# Patient Record
Sex: FEMALE | Race: WHITE | Employment: OTHER | URBAN - METROPOLITAN AREA
[De-identification: names, ages, dates, MRNs, and addresses within clinical notes are randomized per-mention and may not be internally consistent; named-entity substitution may affect disease eponyms.]

---

## 2017-05-20 ENCOUNTER — HOSPITAL ENCOUNTER (EMERGENCY)
Facility: CLINIC | Age: 82
Discharge: HOME | End: 2017-05-20
Attending: EMERGENCY MEDICINE | Admitting: EMERGENCY MEDICINE
Payer: MEDICARE

## 2017-05-20 ENCOUNTER — APPOINTMENT (OUTPATIENT)
Dept: CT IMAGING | Facility: CLINIC | Age: 82
End: 2017-05-20
Attending: EMERGENCY MEDICINE
Payer: MEDICARE

## 2017-05-20 VITALS
DIASTOLIC BLOOD PRESSURE: 88 MMHG | SYSTOLIC BLOOD PRESSURE: 116 MMHG | OXYGEN SATURATION: 97 % | WEIGHT: 150 LBS | TEMPERATURE: 97.6 F | BODY MASS INDEX: 28.32 KG/M2 | HEIGHT: 61 IN | RESPIRATION RATE: 16 BRPM | HEART RATE: 96 BPM

## 2017-05-20 DIAGNOSIS — N32.89 BLADDER MASS: ICD-10-CM

## 2017-05-20 DIAGNOSIS — R10.11 RUQ ABDOMINAL PAIN: ICD-10-CM

## 2017-05-20 LAB
ALBUMIN SERPL-MCNC: 3.6 G/DL (ref 3.4–5)
ALBUMIN UR-MCNC: NEGATIVE MG/DL
ALP SERPL-CCNC: 98 U/L (ref 40–150)
ALT SERPL W P-5'-P-CCNC: 21 U/L (ref 0–50)
ANION GAP SERPL CALCULATED.3IONS-SCNC: 7 MMOL/L (ref 3–14)
APPEARANCE UR: CLEAR
AST SERPL W P-5'-P-CCNC: 20 U/L (ref 0–45)
BASOPHILS # BLD AUTO: 0 10E9/L (ref 0–0.2)
BASOPHILS NFR BLD AUTO: 0.4 %
BILIRUB SERPL-MCNC: 0.6 MG/DL (ref 0.2–1.3)
BILIRUB UR QL STRIP: NEGATIVE
BUN SERPL-MCNC: 22 MG/DL (ref 7–30)
CALCIUM SERPL-MCNC: 9.3 MG/DL (ref 8.5–10.1)
CHLORIDE SERPL-SCNC: 106 MMOL/L (ref 94–109)
CO2 SERPL-SCNC: 27 MMOL/L (ref 20–32)
COLOR UR AUTO: ABNORMAL
CREAT SERPL-MCNC: 0.72 MG/DL (ref 0.52–1.04)
DIFFERENTIAL METHOD BLD: ABNORMAL
EOSINOPHIL # BLD AUTO: 0.4 10E9/L (ref 0–0.7)
EOSINOPHIL NFR BLD AUTO: 5.1 %
ERYTHROCYTE [DISTWIDTH] IN BLOOD BY AUTOMATED COUNT: 13.5 % (ref 10–15)
GFR SERPL CREATININE-BSD FRML MDRD: 76 ML/MIN/1.7M2
GLUCOSE SERPL-MCNC: 91 MG/DL (ref 70–99)
GLUCOSE UR STRIP-MCNC: NEGATIVE MG/DL
HCT VFR BLD AUTO: 46.2 % (ref 35–47)
HGB BLD-MCNC: 16.1 G/DL (ref 11.7–15.7)
HGB UR QL STRIP: NEGATIVE
IMM GRANULOCYTES # BLD: 0 10E9/L (ref 0–0.4)
IMM GRANULOCYTES NFR BLD: 0.3 %
KETONES UR STRIP-MCNC: NEGATIVE MG/DL
LEUKOCYTE ESTERASE UR QL STRIP: NEGATIVE
LIPASE SERPL-CCNC: 173 U/L (ref 73–393)
LYMPHOCYTES # BLD AUTO: 1.9 10E9/L (ref 0.8–5.3)
LYMPHOCYTES NFR BLD AUTO: 24.6 %
MCH RBC QN AUTO: 32.5 PG (ref 26.5–33)
MCHC RBC AUTO-ENTMCNC: 34.8 G/DL (ref 31.5–36.5)
MCV RBC AUTO: 93 FL (ref 78–100)
MONOCYTES # BLD AUTO: 0.5 10E9/L (ref 0–1.3)
MONOCYTES NFR BLD AUTO: 5.7 %
MUCOUS THREADS #/AREA URNS LPF: PRESENT /LPF
NEUTROPHILS # BLD AUTO: 5 10E9/L (ref 1.6–8.3)
NEUTROPHILS NFR BLD AUTO: 63.9 %
NITRATE UR QL: NEGATIVE
NRBC # BLD AUTO: 0 10*3/UL
NRBC BLD AUTO-RTO: 0 /100
PH UR STRIP: 7 PH (ref 5–7)
PLATELET # BLD AUTO: 246 10E9/L (ref 150–450)
POTASSIUM SERPL-SCNC: 4 MMOL/L (ref 3.4–5.3)
PROT SERPL-MCNC: 7.4 G/DL (ref 6.8–8.8)
RBC # BLD AUTO: 4.95 10E12/L (ref 3.8–5.2)
RBC #/AREA URNS AUTO: 1 /HPF (ref 0–2)
SODIUM SERPL-SCNC: 140 MMOL/L (ref 133–144)
SP GR UR STRIP: 1.01 (ref 1–1.03)
SQUAMOUS #/AREA URNS AUTO: <1 /HPF (ref 0–1)
URN SPEC COLLECT METH UR: ABNORMAL
UROBILINOGEN UR STRIP-MCNC: NORMAL MG/DL (ref 0–2)
WBC # BLD AUTO: 7.9 10E9/L (ref 4–11)
WBC #/AREA URNS AUTO: 4 /HPF (ref 0–2)

## 2017-05-20 PROCEDURE — 99285 EMERGENCY DEPT VISIT HI MDM: CPT | Mod: 25

## 2017-05-20 PROCEDURE — 83690 ASSAY OF LIPASE: CPT | Performed by: EMERGENCY MEDICINE

## 2017-05-20 PROCEDURE — 85025 COMPLETE CBC W/AUTO DIFF WBC: CPT | Performed by: EMERGENCY MEDICINE

## 2017-05-20 PROCEDURE — 74177 CT ABD & PELVIS W/CONTRAST: CPT

## 2017-05-20 PROCEDURE — 81001 URINALYSIS AUTO W/SCOPE: CPT | Performed by: EMERGENCY MEDICINE

## 2017-05-20 PROCEDURE — 80053 COMPREHEN METABOLIC PANEL: CPT | Performed by: EMERGENCY MEDICINE

## 2017-05-20 PROCEDURE — 25000125 ZZHC RX 250: Performed by: EMERGENCY MEDICINE

## 2017-05-20 PROCEDURE — 25000128 H RX IP 250 OP 636: Performed by: EMERGENCY MEDICINE

## 2017-05-20 RX ORDER — IOPAMIDOL 755 MG/ML
75 INJECTION, SOLUTION INTRAVASCULAR ONCE
Status: COMPLETED | OUTPATIENT
Start: 2017-05-20 | End: 2017-05-20

## 2017-05-20 RX ADMIN — SODIUM CHLORIDE 63 ML: 9 INJECTION, SOLUTION INTRAVENOUS at 11:19

## 2017-05-20 RX ADMIN — IOPAMIDOL 75 ML: 755 INJECTION, SOLUTION INTRAVENOUS at 11:18

## 2017-05-20 ASSESSMENT — ENCOUNTER SYMPTOMS
CONSTIPATION: 0
FREQUENCY: 0
DYSURIA: 0
HEMATURIA: 0
DIARRHEA: 0
NAUSEA: 0
SHORTNESS OF BREATH: 0
ABDOMINAL PAIN: 1
BLOOD IN STOOL: 0
FEVER: 0
VOMITING: 0

## 2017-05-20 NOTE — ED AVS SNAPSHOT
Emergency Department    [address] [place] [address]    Phone:  [phone]    Fax:  [phone]                                       Eliana Lambert   MRN: [id number]    Department:   Emergency Department   Date of Visit:  5/20/2017           After Visit Summary Signature Page     I have received my discharge instructions, and my questions have been answered. I have discussed any challenges I see with this plan with the nurse or doctor.    ..........................................................................................................................................  Patient/Patient Representative Signature      ..........................................................................................................................................  Patient Representative Print Name and Relationship to Patient    ..................................................               ................................................  Date                                            Time    ..........................................................................................................................................  Reviewed by Signature/Title    ...................................................              ..............................................  Date                                                            Time

## 2017-05-20 NOTE — DISCHARGE INSTRUCTIONS
Abdominal Pain  Abdominal pain is pain in the stomach or intestinal area. Everyone has this pain from time to time. In many cases it goes away on its own. But abdominal pain can sometimes be due to a serious problem, such as appendicitis. So it s important to know when to seek help.  Causes of abdominal pain  There are many possible causes of abdominal pain. Common causes in adults include:    Constipation, diarrhea, or gas    GERD (gastroesophageal reflux disease) movement of stomach acid into the esophagus, also known as acid reflux or heartburn    Peptic ulcer (a sore in the lining of the stomach or small intestine)    Inflammation of the gallbladder, liver, or pancreas    Gallstones or kidney stones    Appendicitis     Obstruction of the intestines     Hernia (bulging of an internal organ through a muscle or other tissue)    Urinary tract infections    In women, menstrual cramps, fibroids, or endometriosis of the uterus    Inflammation or infection of the intestines  Diagnosing the cause of abdominal pain  Your health care provider will examine you to help find the cause of your pain. If needed, tests will be ordered. Because abdominal pain has so many possible causes, it can be hard to discover the reason for the pain. Giving details about your pain can help. Be ready to tell your health care provider where and when you feel the pain and what makes it better or worse. Also mention whether you have other symptoms such as fever, tiredness, nausea, vomiting, or changes in bathroom habits.  Treating abdominal pain  Certain causes of pain, such as appendicitis or a bowel obstruction, need emergency treatment. Other problems can be treated with rest, fluids, or medications. Your health care provider can give you specific instructions for treatment or self-care based on the cause of your pain.  If you have vomiting or diarrhea, sip water or other clear fluids. When you are ready to eat solid foods again, start with  small amounts of easy-to-digest, low-fat foods, such as applesauce, toast, or crackers.   When to call the doctor  Call 911 or go to the hospital right away if you:    Can t pass stool and are vomiting    Are vomiting blood or have black, tarry diarrhea    Also have chest, neck, or shoulder pain    Feel like you are about to pass out    Have pain in your shoulder blades with nausea    Have sudden, excruciating abdominal pain    Have new, severe pain unlike any you have felt before    Have a belly that is rigid, hard, and tender to touch  Call your doctor if you have:    Pain for more than 5 days    Bloating for more than 2 days    Diarrhea for more than 5 days    Fever of 101 F (38.3 C) or higher    Pain that continues to worsen    Unexplained weight loss    Continued lack of appetite    Blood in the stool  How to prevent abdominal pain  Here are some tips to help prevent abdominal pain:    Eat smaller amounts of food at one time.    Avoid greasy, fried, or other high-fat foods.    Avoid foods that give you gas.    Exercise regularly.    Drink plenty of fluids.  To help prevent symptoms of gastroesophageal reflux disease (GERD):    Quit smoking.    Reduce alcohol and certain foods that increase stomach acid.     Lose excess weight.    Finish eating at least 2 hours before you go to bed or lie down.    Elevate the head of your bed.    6623-5964 [employer]. [address] [place] [address], [place] [address]. All rights reserved. This information is not intended as a substitute for professional medical care. Always follow your healthcare professional's instructions.

## 2017-05-20 NOTE — ED PROVIDER NOTES
History     Chief Complaint:  Abdominal Pain       HPI   Eliana Lambert is a 89 year old female with a history of diverticulitis and a previous cholecystectomy who presents accompanied by her daughter for evaluation of abdominal pain. On 5/19/2017, the patient started to develop intermittent cramping right-sided abdominal pain. That night, the patient's pain worsened, prompting her to seek evaluation in the ED this morning. Currently in the ED, the patient's pain is resolved. She states that her recent pain felt similar to what she experienced with diverticulitis, and she expresses primary concern that her pain could be representative of diverticulitis. She has not had any fever, nausea, vomiting, diarrhea, constipation, bloody stools, dysuria, hematuria, urinary frequency, chest pain, or shortness of breath in association with her recent pain. Her last bowel movement was earlier this morning and this was normal. She takes aspirin but is not otherwise anticoagulated.     Allergies:  Clindamycin   Neosporin  Penicillins  Sulfa drugs      Medications:    levothyroxine (SYNTHROID, LEVOTHROID) 25 MCG tablet  aspirin 81 MG chewable tablet  B Complex-C (SUPER B COMPLEX PO)  LECITHIN PO  Cranberry 1000 MG CAPS  Omega-3 Fatty Acids (OMEGA-3 FISH OIL PO)  calcium carbonate (OS-BRADEN 500 MG [place]. CA) 500 MG tablet    Past Medical History:    Chronic kidney disease  Hypothyroidism   Hyperlipidemia  Hypertension    Diverticulitis     Past Surgical History:    Cholecystectomy     Family History:    History reviewed. No pertinent family history.     Social History:  Tobacco use:    Never smoker  Alcohol use:    Positive, occasionally  Marital status:       Accompanied to ED by:  Daughter      Review of Systems   Constitutional: Negative for fever.   Respiratory: Negative for shortness of breath.    Cardiovascular: Negative for chest pain.   Gastrointestinal: Positive for abdominal pain (right-sided, improved ).  "Negative for blood in stool, constipation, diarrhea, nausea and vomiting.   Genitourinary: Negative for dysuria, frequency and hematuria.   All other systems reviewed and are negative.    Physical Exam   First Vitals:  BP: 116/88  Pulse: 96  Temp: 97.6  F (36.4  C)  Resp: 16  Height: 154.9 cm (5' 1\")  Weight: 68 kg (150 lb)  SpO2: 97 %      Physical Exam  SKIN:  Warm, dry.  No jaundice.    HEMATOLOGIC/IMMUNOLOGIC/LYMPHATIC:  No pallor.  EYES:  Conjunctivae normal.  CARDIOVASCULAR:  Regular rate and rhythm.  RESPIRATORY:  No respiratory distress, breath sounds equal and normal.  GASTROINTESTINAL:  Soft tender abdomen on the right side with active bowel sounds.  No distension.  No abdominal mass.  No hepatomegaly.  MUSCULOSKELETAL:  ROM of the torso does not exacerbate the abdominal pain.  NEUROLOGIC:  Alert, conversant.  PSYCHIATRIC:  Normal mood.    Emergency Department Course     Imaging:  Radiographic findings were communicated with the patient and family who voiced understanding of the findings.    CT Abdomen Pelvis w Contrast:  IMPRESSION:  1. No acute process demonstrated in the abdomen or pelvis.  2. Enlarging lesions in the bladder concerning for malignancy. Cystoscopy recommended for further evaluation.  Preliminary report per radiology.     Laboratory:  CBC: HGB 16.1 high, o/w WNL (WBC 7.9, )    BMP: WNL (Creatinine 0.72)  Lipase: 173   UA with Microscopic: WBC 4 high, Mucous present, o/w Negative     Emergency Department Course:  Nursing notes and vitals reviewed.  1019: I performed an exam of the patient as documented above.     1159: I updated and reassessed the patient.     Findings and plan explained to the Patient and daughter. Patient discharged home with instructions regarding supportive care, medications, and reasons to return. The importance of close follow-up was reviewed.    Impression & Plan      Medical Decision Making:  Eliana Lambert is a 89 year old female who presents with " intermittent abdominal pain favoring the right side, specifically the right upper quadrant. Thorough evaluation undertaken which revealed no distinct cause for her pain. Incidentally noted was bladder lesions, which will need further investigation likely by cystoscopy and urologic follow up. I informed the patient and her daughter of these findings and they understand that this needs further assessment. Here, she has been symptom free during her evaluation. I advised tylenol for pain if need be and to return if recurrent concerning pain.     Diagnosis:    ICD-10-CM   1. RUQ abdominal pain R10.11   2. Bladder mass N32.89       Disposition:  Discharged to home.       I, Britton Yang, am serving as a scribe at 10:19 AM on 5/20/2017 to document services personally performed by Dr. Ivan, based on my observations and the provider's statements to me.     EMERGENCY DEPARTMENT       Stephen Ivan MD  05/20/17 7201

## 2017-05-20 NOTE — ED AVS SNAPSHOT
Emergency Department    [address] [facility] [address]    Phone:  [phone]    Fax:  [phone]                                       Eliana Lambert   MRN: [id number]    Department:   Emergency Department   Date of Visit:  5/20/2017           Patient Information     Date Of Birth          9/13/1925        Your diagnoses for this visit were:     RUQ abdominal pain     Bladder mass        You were seen by Stephen Ivan MD.      Follow-up Information     Follow up with Oswald Hodges MD.    Specialty:  Urology    Why:  follow up with the urologist to check your bladder     Contact information:    [facility]  [address] [address]  [phone]          Discharge Instructions         Abdominal Pain  Abdominal pain is pain in the stomach or intestinal area. Everyone has this pain from time to time. In many cases it goes away on its own. But abdominal pain can sometimes be due to a serious problem, such as appendicitis. So it s important to know when to seek help.  Causes of abdominal pain  There are many possible causes of abdominal pain. Common causes in adults include:    Constipation, diarrhea, or gas    GERD (gastroesophageal reflux disease) movement of stomach acid into the esophagus, also known as acid reflux or heartburn    Peptic ulcer (a sore in the lining of the stomach or small intestine)    Inflammation of the gallbladder, liver, or pancreas    Gallstones or kidney stones    Appendicitis     Obstruction of the intestines     Hernia (bulging of an internal organ through a muscle or other tissue)    Urinary tract infections    In women, menstrual cramps, fibroids, or endometriosis of the uterus    Inflammation or infection of the intestines  Diagnosing the cause of abdominal pain  Your health care provider will examine you to help find the cause of your pain. If needed, tests will be ordered. Because abdominal pain has so many possible  causes, it can be hard to discover the reason for the pain. Giving details about your pain can help. Be ready to tell your health care provider where and when you feel the pain and what makes it better or worse. Also mention whether you have other symptoms such as fever, tiredness, nausea, vomiting, or changes in bathroom habits.  Treating abdominal pain  Certain causes of pain, such as appendicitis or a bowel obstruction, need emergency treatment. Other problems can be treated with rest, fluids, or medications. Your health care provider can give you specific instructions for treatment or self-care based on the cause of your pain.  If you have vomiting or diarrhea, sip water or other clear fluids. When you are ready to eat solid foods again, start with small amounts of easy-to-digest, low-fat foods, such as applesauce, toast, or crackers.   When to call the doctor  Call 911 or go to the hospital right away if you:    Can t pass stool and are vomiting    Are vomiting blood or have black, tarry diarrhea    Also have chest, neck, or shoulder pain    Feel like you are about to pass out    Have pain in your shoulder blades with nausea    Have sudden, excruciating abdominal pain    Have new, severe pain unlike any you have felt before    Have a belly that is rigid, hard, and tender to touch  Call your doctor if you have:    Pain for more than 5 days    Bloating for more than 2 days    Diarrhea for more than 5 days    Fever of 101 F (38.3 C) or higher    Pain that continues to worsen    Unexplained weight loss    Continued lack of appetite    Blood in the stool  How to prevent abdominal pain  Here are some tips to help prevent abdominal pain:    Eat smaller amounts of food at one time.    Avoid greasy, fried, or other high-fat foods.    Avoid foods that give you gas.    Exercise regularly.    Drink plenty of fluids.  To help prevent symptoms of gastroesophageal reflux disease (GERD):    Quit smoking.    Reduce alcohol and  certain foods that increase stomach acid.     Lose excess weight.    Finish eating at least 2 hours before you go to bed or lie down.    Elevate the head of your bed.    2603-4469 [employer]. [address] [place] [address], [place] [address]. All rights reserved. This information is not intended as a substitute for professional medical care. Always follow your healthcare professional's instructions.          24 Hour Appointment Hotline       To make an appointment at any [facility], call [phone] ([phone]). If you don't have a family doctor or clinic, we will help you find one. [facility] are conveniently located to serve the needs of you and your family.             Review of your medicines      Our records show that you are taking the medicines listed below. If these are incorrect, please call your family doctor or clinic.        Dose / Directions Last dose taken    aspirin 81 MG chewable tablet   Dose:  325 mg        Take 325 mg by mouth daily   Refills:  0        calcium carbonate 500 MG tablet   Commonly known as:  OS-BRADEN 500 mg [place]. Ca   Dose:  500 mg        Take 500 mg by mouth 2 times daily   Refills:  0        Cranberry 1000 MG Caps        Refills:  0        LECITHIN PO        Refills:  0        levothyroxine 25 MCG tablet   Commonly known as:  SYNTHROID/LEVOTHROID   Dose:  25 mcg   Quantity:  90 tablet        Take 1 tablet (25 mcg) by mouth daily   Refills:  3        OMEGA-3 FISH OIL PO        Refills:  0        SUPER B COMPLEX PO        Refills:  0                Procedures and tests performed during your visit     CBC with platelets differential    CT Abdomen Pelvis w Contrast    Comprehensive metabolic panel    Lipase    UA with Microscopic      Orders Needing Specimen Collection     None      Pending Results     Date and Time Order Name Status Description    5/20/2017 1026 CT Abdomen Pelvis w Contrast Preliminary             Pending Culture Results     No orders found  from 5/18/2017 to 5/21/2017.            Pending Results Instructions     If you had any lab results that were not finalized at the time of your Discharge, you can call the ED Lab Result RN at [phone]. You will be contacted by this team for any positive Lab results or changes in treatment. The nurses are available 7 days a week from 10A to 6:30P.  You can leave a message 24 hours per day and they will return your call.        Test Results From Your Hospital Stay        5/20/2017 10:39 AM      Component Results     Component Value Ref Range & Units Status    WBC 7.9 4.0 - 11.0 10e9/L Final    RBC Count 4.95 3.8 - 5.2 10e12/L Final    Hemoglobin 16.1 (H) 11.7 - 15.7 g/dL Final    Hematocrit 46.2 35.0 - 47.0 % Final    MCV 93 78 - 100 fl Final    MCH 32.5 26.5 - 33.0 pg Final    MCHC 34.8 31.5 - 36.5 g/dL Final    RDW 13.5 10.0 - 15.0 % Final    Platelet Count 246 150 - 450 10e9/L Final    Diff Method Automated Method  Final    % Neutrophils 63.9 % Final    % Lymphocytes 24.6 % Final    % Monocytes 5.7 % Final    % Eosinophils 5.1 % Final    % Basophils 0.4 % Final    % Immature Granulocytes 0.3 % Final    Nucleated RBCs 0 0 /100 Final    Absolute Neutrophil 5.0 1.6 - 8.3 10e9/L Final    Absolute Lymphocytes 1.9 0.8 - 5.3 10e9/L Final    Absolute Monocytes 0.5 0.0 - 1.3 10e9/L Final    Absolute Eosinophils 0.4 0.0 - 0.7 10e9/L Final    Absolute Basophils 0.0 0.0 - 0.2 10e9/L Final    Abs Immature Granulocytes 0.0 0 - 0.4 10e9/L Final    Absolute Nucleated RBC 0.0  Final         5/20/2017 10:58 AM      Component Results     Component Value Ref Range & Units Status    Sodium 140 133 - 144 mmol/L Final    Potassium 4.0 3.4 - 5.3 mmol/L Final    Chloride 106 94 - 109 mmol/L Final    Carbon Dioxide 27 20 - 32 mmol/L Final    Anion Gap 7 3 - 14 mmol/L Final    Glucose 91 70 - 99 mg/dL Final    Urea Nitrogen 22 7 - 30 mg/dL Final    Creatinine 0.72 0.52 - 1.04 mg/dL Final    GFR Estimate 76 >60 mL/min/1.7m2 Final    Non   GFR Calc    GFR Estimate If Black >90   GFR Calc   >60 mL/min/1.7m2 Final    Calcium 9.3 8.5 - 10.1 mg/dL Final    Bilirubin Total 0.6 0.2 - 1.3 mg/dL Final    Albumin 3.6 3.4 - 5.0 g/dL Final    Protein Total 7.4 6.8 - 8.8 g/dL Final    Alkaline Phosphatase 98 40 - 150 U/L Final    ALT 21 0 - 50 U/L Final    AST 20 0 - 45 U/L Final         5/20/2017 10:56 AM      Component Results     Component Value Ref Range & Units Status    Lipase 173 73 - 393 U/L Final         5/20/2017 11:57 AM      Component Results     Component Value Ref Range & Units Status    Color Urine Straw  Final    Appearance Urine Clear  Final    Glucose Urine Negative NEG mg/dL Final    Bilirubin Urine Negative NEG Final    Ketones Urine Negative NEG mg/dL Final    Specific Gravity Urine 1.014 1.003 - 1.035 Final    Blood Urine Negative NEG Final    pH Urine 7.0 5.0 - 7.0 pH Final    Protein Albumin Urine Negative NEG mg/dL Final    Urobilinogen mg/dL Normal 0.0 - 2.0 mg/dL Final    Nitrite Urine Negative NEG Final    Leukocyte Esterase Urine Negative NEG Final    Source Midstream Urine  Final    WBC Urine 4 (H) 0 - 2 /HPF Final    RBC Urine 1 0 - 2 /HPF Final    Squamous Epithelial /HPF Urine <1 0 - 1 /HPF Final    Mucous Urine Present (A) NEG /LPF Final         5/20/2017 11:37 AM      Narrative     CT ABDOMEN AND PELVIS WITH CONTRAST 5/20/2017 11:25 AM     HISTORY: Right-sided abdominal pain, intermittent since yesterday.    COMPARISON: 6/3/2016.    TECHNIQUE: Volumetric helical acquisition of CT images from the lung  bases through the symphysis pubis after the administration of 75 mL  Isovue 370 intravenous contrast. Radiation dose for this scan was  reduced using automated exposure control, adjustment of the mA and/or  kV according to patient size, or iterative reconstruction technique.    FINDINGS: The liver, spleen, adrenal glands, and pancreas demonstrate  no worrisome focal lesion. Renal cysts and low dense  lesions too small  to characterize are seen in both kidneys. No hydronephrosis. There are  no dilated loops of small intestine or large bowel to suggest ileus or  obstruction. There are extensive atherosclerotic changes of the  visualized aorta and its branches. There is no evidence of aortic  aneurysm. There is moderate diverticulosis without evidence for  diverticulitis. No free fluid. No free air. There are no lymph nodes  that are abnormal by size criteria. Enlarging lesions in the bladder,  one measures 3.7 x 3.0 cm. Previously, this measured 1.8 x 1.4 cm. The  lesion near the UVJ on the right is also increased. Cystoscopy is  needed to exclude malignancy. Survey of the visualized bony structures  demonstrates no destructive bony lesions. The visualized lung bases  are unremarkable.        Impression     IMPRESSION:  1. No acute process demonstrated in the abdomen or pelvis.  2. Enlarging lesions in the bladder concerning for malignancy.  Cystoscopy recommended for further evaluation.                Clinical Quality Measure: Blood Pressure Screening     Your blood pressure was checked while you were in the emergency department today. The last reading we obtained was  BP: 116/88 . Please read the guidelines below about what these numbers mean and what you should do about them.  If your systolic blood pressure (the top number) is less than 120 and your diastolic blood pressure (the bottom number) is less than 80, then your blood pressure is normal. There is nothing more that you need to do about it.  If your systolic blood pressure (the top number) is 120-139 or your diastolic blood pressure (the bottom number) is 80-89, your blood pressure may be higher than it should be. You should have your blood pressure rechecked within a year by a primary care provider.  If your systolic blood pressure (the top number) is 140 or greater or your diastolic blood pressure (the bottom number) is 90 or greater, you may have high  "blood pressure. High blood pressure is treatable, but if left untreated over time it can put you at risk for heart attack, stroke, or kidney failure. You should have your blood pressure rechecked by a primary care provider within the next 4 weeks.  If your provider in the emergency department today gave you specific instructions to follow-up with your doctor or provider even sooner than that, you should follow that instruction and not wait for up to 4 weeks for your follow-up visit.        Thank you for choosing [facility]       Thank you for choosing [facility] for your care. Our goal is always to provide you with excellent care. Hearing back from our patients is one way we can continue to improve our services. Please take a few minutes to complete the written survey that you may receive in the mail after you visit with us. Thank you!        [employer] [employer] Information     [employer] lets you send messages to your doctor, view your test results, renew your prescriptions, schedule appointments and more. To sign up, go to [email or web address] . Click on \"Log in\" on the left side of the screen, which will take you to the Welcome page. Then click on \"Sign up Now\" on the right side of the page.     You will be asked to enter the access code listed below, as well as some personal information. Please follow the directions to create your username and password.     Your access code is: [email or web address]  Expires: 2017 12:17 PM     Your access code will  in 90 days. If you need help or a new code, please call your [facility] clinic or [phone].        Care EveryWhere ID     This is your Care EveryWhere ID. This could be used by other organizations to access your [facility] medical records  [id number]        After Visit Summary       This is your record. Keep this with you and show to your community pharmacist(s) and doctor(s) at your next visit.                  "

## 2017-06-20 DIAGNOSIS — N32.89 BLADDER MASS: Primary | ICD-10-CM

## 2017-06-22 DIAGNOSIS — N32.89 BLADDER MASS: Primary | ICD-10-CM

## 2017-06-23 ENCOUNTER — OFFICE VISIT (OUTPATIENT)
Dept: UROLOGY | Facility: CLINIC | Age: 82
End: 2017-06-23
Payer: COMMERCIAL

## 2017-06-23 VITALS
SYSTOLIC BLOOD PRESSURE: 130 MMHG | HEART RATE: 80 BPM | HEIGHT: 61 IN | DIASTOLIC BLOOD PRESSURE: 70 MMHG | WEIGHT: 150 LBS | BODY MASS INDEX: 28.32 KG/M2

## 2017-06-23 DIAGNOSIS — N32.89 BLADDER MASS: ICD-10-CM

## 2017-06-23 LAB
ALBUMIN UR-MCNC: 100 MG/DL
APPEARANCE UR: ABNORMAL
BILIRUB UR QL STRIP: NEGATIVE
COLOR UR AUTO: YELLOW
GLUCOSE UR STRIP-MCNC: NEGATIVE MG/DL
HGB UR QL STRIP: ABNORMAL
KETONES UR STRIP-MCNC: NEGATIVE MG/DL
LEUKOCYTE ESTERASE UR QL STRIP: ABNORMAL
NITRATE UR QL: NEGATIVE
PH UR STRIP: 5.5 PH (ref 5–7)
SP GR UR STRIP: 1.02 (ref 1–1.03)
URN SPEC COLLECT METH UR: ABNORMAL
UROBILINOGEN UR STRIP-ACNC: 0.2 EU/DL (ref 0.2–1)

## 2017-06-23 PROCEDURE — 52000 CYSTOURETHROSCOPY: CPT | Performed by: UROLOGY

## 2017-06-23 PROCEDURE — 81003 URINALYSIS AUTO W/O SCOPE: CPT | Performed by: UROLOGY

## 2017-06-23 PROCEDURE — 99203 OFFICE O/P NEW LOW 30 MIN: CPT | Mod: 25 | Performed by: UROLOGY

## 2017-06-23 RX ORDER — CIPROFLOXACIN 500 MG/1
500 TABLET, FILM COATED ORAL DAILY
Qty: 2 TABLET | Refills: 0 | Status: ON HOLD | OUTPATIENT
Start: 2017-06-23 | End: 2017-07-07

## 2017-06-23 RX ORDER — CIPROFLOXACIN 500 MG/1
500 TABLET, FILM COATED ORAL ONCE
Qty: 1 TABLET | Refills: 0 | Status: SHIPPED | OUTPATIENT
Start: 2017-06-23 | End: 2017-06-23

## 2017-06-23 ASSESSMENT — PAIN SCALES - GENERAL: PAINLEVEL: NO PAIN (0)

## 2017-06-23 NOTE — LETTER
6/23/2017       RE: Eliana Lambert  [address]   [facility] [address]     Dear Colleague,    Thank you for referring your patient, Eliana Lambert, to the [facility] [place] at [facility]. Please see a copy of my visit note below.    Eliana Lambert is a 89-year-old female with recent lower abdominal pain. CT scan shows no hydronephrosis and a large bladder mass involving the bladder neck and near the right ureteral orifice. CT scan a year ago was consistent with the same-it was reported as probable bladder stones. Unfortunately, the patient had no urologic follow-up a year ago.  Other past medical history:  thyroid disease, former smoker  Medications: Low-dose aspirin, Synthroid, lecithin, cranberry capsules, calcium carbonate, B complex  Allergies: Clindamycin, Neosporin, penicillin, shrimp, sulfa  Review of systems: Slower, dribbling stream in the past year, no incontinence, no gross hematuria  Exam: Normal appearance, normal vital signs, alert and oriented, normocephalic, normal respirations, neuro grossly intact. Normal urethral meatus, small rectocele    Flexible cystoscopy-normal urethra, tumor obstructing bladder neck-papillary. Cloudy urine, tumor above right ureteral orifice, raised erythema near right ureteral orifice and on trigone.  Assessment: Transitional cell carcinoma of the bladder-met with patient and daughter and discussed need for transurethral resection of bladder tumor, possible right double-J stent, stages, grades, follow-up treatment, potential risk of incontinence with bladder tumor resection.  Plan: Cipro 500 mg ×1 tonight. Stop aspirin. Schedule transurethral resection of bladder tumor, possible right double-J stent. Cipro 500 mg ×1 the night before surgery.    Again, thank you for allowing me to participate in the care of your patient.      Sincerely,    Artemio Mcclain MD

## 2017-06-23 NOTE — PATIENT INSTRUCTIONS
"     AFTER YOUR CYSTOSCOPY         You have just completed a cystoscopy, or \"cysto\", which allowed your physician to learn more about your bladder (or to remove a stent placed after surgery). We suggest that you continue to avoid caffeine, fruit juice, and alcohol for the next 24 hours, however, you are encouraged to return to your normal activities.       A few things that are considered normal after your cystoscopy:    * small amount of bleeding (or spotting) that clears within the next 24 hours    * slight burning sensation with urination    * sensation to of needing to avoid more frequently    * the feeling of \"air\" in your urine    * mild discomfort that is relieved with Tylonol        Please contact our office promptly if you:    * develop a fever above 101 degrees    * are unable to urinate    * develop bright red blood that does not stop    * severe pain or swelling        And of course, please contact our office with any concerns or questions [phone]      "

## 2017-06-23 NOTE — PROGRESS NOTES
Eliana Lambert is a 89-year-old female with recent lower abdominal pain. CT scan shows no hydronephrosis and a large bladder mass involving the bladder neck and near the right ureteral orifice. CT scan a year ago was consistent with the same-it was reported as probable bladder stones. Unfortunately, the patient had no urologic follow-up a year ago.  Other past medical history:  thyroid disease, former smoker  Medications: Low-dose aspirin, Synthroid, lecithin, cranberry capsules, calcium carbonate, B complex  Allergies: Clindamycin, Neosporin, penicillin, shrimp, sulfa  Review of systems: Slower, dribbling stream in the past year, no incontinence, no gross hematuria  Exam: Normal appearance, normal vital signs, alert and oriented, normocephalic, normal respirations, neuro grossly intact. Normal urethral meatus, small rectocele    Flexible cystoscopy-normal urethra, tumor obstructing bladder neck-papillary. Cloudy urine, tumor above right ureteral orifice, raised erythema near right ureteral orifice and on trigone.  Assessment: Transitional cell carcinoma of the bladder-met with patient and daughter and discussed need for transurethral resection of bladder tumor, possible right double-J stent, stages, grades, follow-up treatment, potential risk of incontinence with bladder tumor resection.  Plan: Cipro 500 mg ×1 tonight. Stop aspirin. Schedule transurethral resection of bladder tumor, possible right double-J stent. Cipro 500 mg ×1 the night before surgery.

## 2017-06-23 NOTE — MR AVS SNAPSHOT
"              After Visit Summary   6/23/2017    Eliana Lambert    MRN: [id number]           Patient Information     Date Of Birth          9/13/1925        Visit Information        Provider Department      6/23/2017 3:00 PM Artemio Mcclain MD; [facility] [place]         Follow-ups after your visit        Your next 10 appointments already scheduled     Jun 23, 2017  3:00 PM CDT   New Patient Visit with Artemio Mcclain MD, [facility] ([facility] [place])    [address]  [address]  [place] [id number] [phone]              Future tests that were ordered for you today     Open Future Orders        Priority Expected Expires Ordered    UA without Microscopic Routine  6/20/2018 6/20/2017            Who to contact     If you have questions or need follow up information about today's clinic visit or your schedule please contact [facility]OLOGY [facility] directly at [phone].  Normal or non-critical lab and imaging results will be communicated to you by Novogenhart, letter or phone within 4 business days after the clinic has received the results. If you do not hear from us within 7 days, please contact the clinic through Webtalkt or phone. If you have a critical or abnormal lab result, we will notify you by phone as soon as possible.  Submit refill requests through [email or web address] or call your pharmacy and they will forward the refill request to us. Please allow 3 business days for your refill to be completed.          Additional Information About Your Visit        Novogenhart Information     [email or web address] lets you send messages to your doctor, view your test results, renew your prescriptions, schedule appointments and more. To sign up, go to [email or web address] . Click on \"Log in\" on the left side of the screen, which will take you to the Welcome page. Then click on \"Sign up Now\" on the right " side of the page.     You will be asked to enter the access code listed below, as well as some personal information. Please follow the directions to create your username and password.     Your access code is: [email or web address]  Expires: 2017 12:17 PM     Your access code will  in 90 days. If you need help or a new code, please call your [facility] or [phone].        Care EveryWhere ID     This is your Care EveryWhere ID. This could be used by other organizations to access your [facility] medical records  [id number]         Blood Pressure from Last 3 Encounters:   17 116/88   10/27/16 108/64   16 102/64    Weight from Last 3 Encounters:   17 68 kg (150 lb)   10/27/16 69 kg (152 lb 3.2 oz)   16 71.7 kg (158 lb)              Today, you had the following     No orders found for display       Primary Care Provider Office Phone # Fax #    Lowell Robertson [phone]       [facility] [address] [facility] [address]        Thank you!     Thank you for choosing [facility]OLOG[facility] CLIN[facility] [place]  for your care. Our goal is always to provide you with excellent care. Hearing back from our patients is one way we can continue to improve our services. Please take a few minutes to complete the written survey that you may receive in the mail after your visit with us. Thank you!             Your Updated Medication List - Protect others around you: Learn how to safely use, store and throw away your medicines at www.disposemymeds.org.          This list is accurate as of: 17  1:55 PM.  Always use your most recent med list.                   Brand Name Dispense Instructions for use    aspirin 81 MG chewable tablet      Take 325 mg by mouth daily       calcium carbonate 1250 MG tablet    OS-BRADEN 500 mg [place]. Ca     Take 500 mg by mouth 2 times daily       Cranberry 1000 MG Caps          LECITHIN PO          levothyroxine 25 MCG tablet     SYNTHROID/LEVOTHROID    90 tablet    Take 1 tablet (25 mcg) by mouth daily       OMEGA-3 FISH OIL PO          SUPER B COMPLEX PO

## 2017-06-23 NOTE — NURSING NOTE
Chief Complaint   Patient presents with     Cystoscopy     bladder mass     Shadia Clayton LPN 2:49 PM June 23, 2017    Prior to the start of the procedure and with procedural staff participation, I verbally confirmed the patient s identity using two indicators, relevant allergies, that the procedure was appropriate and matched the consent or emergent situation, and that the correct equipment/implants were available. Immediately prior to starting the procedure I conducted the Time Out with the procedural staff and re-confirmed the patient s name, procedure, and site/side. I have wiped the patient off with the povidone-Iodine solution, draped them,  used Lidocaine hydrochloride jelly, and instilled sterile water into the bladder. (The Joint Commission universal protocol was followed.)  Yes    Sedation (Moderate or Deep): None    Shadia Clayton LPN 2:50 PM June 23, 2017

## 2017-06-28 ENCOUNTER — OFFICE VISIT (OUTPATIENT)
Dept: INTERNAL MEDICINE | Facility: CLINIC | Age: 82
End: 2017-06-28
Payer: COMMERCIAL

## 2017-06-28 VITALS
HEART RATE: 84 BPM | SYSTOLIC BLOOD PRESSURE: 128 MMHG | WEIGHT: 151 LBS | OXYGEN SATURATION: 98 % | HEIGHT: 60 IN | BODY MASS INDEX: 29.64 KG/M2 | TEMPERATURE: 97.6 F | DIASTOLIC BLOOD PRESSURE: 66 MMHG

## 2017-06-28 DIAGNOSIS — I10 ESSENTIAL HYPERTENSION WITH GOAL BLOOD PRESSURE LESS THAN 140/90: ICD-10-CM

## 2017-06-28 DIAGNOSIS — I48.91 ATRIAL FIBRILLATION, UNSPECIFIED TYPE (H): ICD-10-CM

## 2017-06-28 DIAGNOSIS — E03.8 OTHER SPECIFIED HYPOTHYROIDISM: ICD-10-CM

## 2017-06-28 DIAGNOSIS — Z01.818 PREOP GENERAL PHYSICAL EXAM: Primary | ICD-10-CM

## 2017-06-28 LAB
HGB BLD-MCNC: 14.6 G/DL (ref 11.7–15.7)
PLATELET # BLD AUTO: 280 10E9/L (ref 150–450)
POTASSIUM SERPL-SCNC: 4.6 MMOL/L (ref 3.4–5.3)
TSH SERPL DL<=0.005 MIU/L-ACNC: 2.52 MU/L (ref 0.4–4)

## 2017-06-28 PROCEDURE — 36415 COLL VENOUS BLD VENIPUNCTURE: CPT | Performed by: INTERNAL MEDICINE

## 2017-06-28 PROCEDURE — 85049 AUTOMATED PLATELET COUNT: CPT | Performed by: INTERNAL MEDICINE

## 2017-06-28 PROCEDURE — 84443 ASSAY THYROID STIM HORMONE: CPT | Performed by: INTERNAL MEDICINE

## 2017-06-28 PROCEDURE — 85018 HEMOGLOBIN: CPT | Performed by: INTERNAL MEDICINE

## 2017-06-28 PROCEDURE — 99215 OFFICE O/P EST HI 40 MIN: CPT | Performed by: INTERNAL MEDICINE

## 2017-06-28 PROCEDURE — 84132 ASSAY OF SERUM POTASSIUM: CPT | Performed by: INTERNAL MEDICINE

## 2017-06-28 PROCEDURE — 93000 ELECTROCARDIOGRAM COMPLETE: CPT | Performed by: INTERNAL MEDICINE

## 2017-06-28 NOTE — NURSING NOTE
Chief Complaint   Patient presents with     Pre-Op Exam       Initial /66  Pulse 104  Temp 97.6  F (36.4  C) (Oral)  Ht 5' (1.524 m)  Wt 151 lb (68.5 kg)  SpO2 98%  BMI 29.49 kg/m2 Estimated body mass index is 29.49 kg/(m^2) as calculated from the following:    Height as of this encounter: 5' (1.524 m).    Weight as of this encounter: 151 lb (68.5 kg).  Medication Reconciliation: complete   Shanon Pimentel, CMA

## 2017-06-28 NOTE — MR AVS SNAPSHOT
After Visit Summary   6/28/2017    Eliana Lambert    MRN: [id number]           Patient Information     Date Of Birth          9/13/1925        Visit Information        Provider Department      6/28/2017 10:00 AM Lowell Robertson MD [facility]        Today's Diagnoses     Preop general physical exam    -  1    Atrial fibrillation, unspecified type (H)        Essential hypertension with goal blood pressure less than 140/90        Other specified hypothyroidism          Care Instructions      Before Your Surgery      Call your surgeon if there is any change in your health. This includes signs of a cold or flu (such as a sore throat, runny nose, cough, rash or fever).    Do not smoke, drink alcohol or take over the counter medicine (unless your surgeon or primary care doctor tells you to) for the 24 hours before and after surgery.    If you take prescribed drugs: Follow your doctor s orders about which medicines to take and which to stop until after surgery.    Eating and drinking prior to surgery: follow the instructions from your surgeon    Take a shower or bath the night before surgery. Use the soap your surgeon gave you to gently clean your skin. If you do not have soap from your surgeon, use your regular soap. Do not shave or scrub the surgery site.  Wear clean pajamas and have clean sheets on your bed.           Follow-ups after your visit        Follow-up notes from your care team     Return if symptoms worsen or fail to improve.      Your next 10 appointments already scheduled     Jul 06, 2017   Procedure with Artemio Mcclain MD   [facility] (--)    [address] [facility] [address]   [phone]            Jul 21, 2017  2:30 PM CDT   Cystoscopy with Artemio Mcclain MD,  [facility] [place] ([facility] [place])    [address]  [address]  [facility] [id number] [phone]          "     Who to contact     If you have questions or need follow up information about today's clinic visit or your schedule please contact [facility] directly at [phone].  Normal or non-critical lab and imaging results will be communicated to you by MyChart, letter or phone within 4 business days after the clinic has received the results. If you do not hear from us within 7 days, please contact the clinic through MyChart or phone. If you have a critical or abnormal lab result, we will notify you by phone as soon as possible.  Submit refill requests through Lawdingo or call your pharmacy and they will forward the refill request to us. Please allow 3 business days for your refill to be completed.          Additional Information About Your Visit        [facility] Information     Lawdingo lets you send messages to your doctor, view your test results, renew your prescriptions, schedule appointments and more. To sign up, go to [email or web address] . Click on \"Log in\" on the left side of the screen, which will take you to the Welcome page. Then click on \"Sign up Now\" on the right side of the page.     You will be asked to enter the access code listed below, as well as some personal information. Please follow the directions to create your username and password.     Your access code is: [email or web address]  Expires: 2017 12:17 PM     Your access code will  in 90 days. If you need help or a new code, please call your [facility] clinic or [phone].        Care EveryWhere ID     This is your Care EveryWhere ID. This could be used by other organizations to access your [facility] medical records  [id number]        Your Vitals Were     Pulse Temperature Height Pulse Oximetry BMI (Body Mass Index)       84 97.6  F (36.4  C) (Oral) 5' (1.524 m) 98% 29.49 kg/m2        Blood Pressure from Last 3 Encounters:   17 128/66   17 130/70   17 116/88    Weight from Last 3 Encounters:   17 " 151 lb (68.5 kg)   06/23/17 150 lb (68 kg)   05/20/17 150 lb (68 kg)              We Performed the Following     EKG 12-lead complete w/read - Clinics     Hemoglobin     Platelet count     Potassium     TSH with free T4 reflex        Primary Care Provider Office Phone # Fax #    Lowell Robertson [phone]       [facility] [address]  [facility] [address]        Equal Access to Services     CLARENCE ROSALES : Hadii aad ku hadasho Soomaali, waaxda luqadaha, qaybta kaalmada adeegyada, waxay idiin hayaan adeeg kharash la'aan . So [facility] [phone].    ATENCIÓN: Si habla español, tiene a esquivel disposición servicios gratuitos de asistencia lingüística. [facility] [phone].    We comply with applicable federal civil rights laws and [place] laws. We do not discriminate on the basis of race, color, national origin, age, disability sex, sexual orientation or gender identity.            Thank you!     Thank you for choosing [facility]  for your care. Our goal is always to provide you with excellent care. Hearing back from our patients is one way we can continue to improve our services. Please take a few minutes to complete the written survey that you may receive in the mail after your visit with us. Thank you!             Your Updated Medication List - Protect others around you: Learn how to safely use, store and throw away your medicines at www.disposemymeds.org.          This list is accurate as of: 6/28/17 10:33 AM.  Always use your most recent med list.                   Brand Name Dispense Instructions for use Diagnosis    aspirin 81 MG chewable tablet      Take 325 mg by mouth daily        calcium carbonate 1250 MG tablet    OS-BRADEN 500 mg [place]. Ca     Take 500 mg by mouth 2 times daily        ciprofloxacin 500 MG tablet    CIPRO    2 tablet    Take 1 tablet (500 mg) by mouth daily Take with evening meal today and evening meal the night before surgery    Bladder  mass       Cranberry 1000 MG Caps           LECITHIN PO           levothyroxine 25 MCG tablet    SYNTHROID/LEVOTHROID    90 tablet    Take 1 tablet (25 mcg) by mouth daily    Other specified hypothyroidism       SUPER B COMPLEX PO

## 2017-06-28 NOTE — PROGRESS NOTES
[facility]  [address] [address]  [phone]  Dept: [phone]    PRE-OP EVALUATION:  Today's date: 2017    Eliana Lambert (: 1925) presents for pre-operative evaluation assessment as requested by Dr. Mcclain.  She requires evaluation and anesthesia risk assessment prior to undergoing surgery/procedure for treatment of bladder mass.  Proposed procedure: Combined cystoscopy, transurethral resection (TUR) tumor bladder.    Date of Surgery/ Procedure: 17  Time of Surgery/ Procedure: 1:15 pm  Hospital/Surgical Facility: [facility]  Primary Physician: Lowell Robertson  Type of Anesthesia Anticipated: General    Patient has a Health Care Directive or Living Will:  YES     HPI:                                                    89 year old female:  Brief HPI related to upcoming procedure: treatment of bladder mass.  Proposed procedure: Combined cystoscopy, transurethral resection (TUR) tumor bladder.    MEDICAL HISTORY:                                                      Patient Active Problem List    Diagnosis Date Noted     Atrial fibrillation, unspecified type (H) 2017     Priority: Medium     Essential hypertension with goal blood pressure less than 140/90 10/27/2016     Priority: Medium     Knee pain, unspecified laterality 10/26/2015     Priority: Medium     Other specified hypothyroidism 10/21/2015     Priority: Medium     Hyperlipidemia LDL goal <130 2014     Priority: Medium     UTI (urinary tract infection) 2014     Priority: Medium      Past Medical History:   Diagnosis Date     Chronic kidney disease      Spider veins      Thyroid disease      Past Surgical History:   Procedure Laterality Date     CHOLECYSTECTOMY       Current Outpatient Prescriptions   Medication Sig Dispense Refill     ciprofloxacin (CIPRO) 500 MG tablet Take 1 tablet (500 mg) by mouth daily Take with evening meal today and evening meal the night before  surgery 2 tablet 0     levothyroxine (SYNTHROID, LEVOTHROID) 25 MCG tablet Take 1 tablet (25 mcg) by mouth daily 90 tablet 3     aspirin 81 MG chewable tablet Take 325 mg by mouth daily        B Complex-C (SUPER B COMPLEX PO)        LECITHIN PO        Cranberry 1000 MG CAPS        calcium carbonate (OS-BRADEN 500 MG [place]. CA) 500 MG tablet Take 500 mg by mouth 2 times daily       OTC products: None, except as noted above    Allergies   Allergen Reactions     Clindamycin GI Disturbance     Neosporin      Skin reaction     Penicillins      Shrimp      hives     Sulfa Drugs      nausea      Latex Allergy: NO    Social History   Substance Use Topics     Smoking status: Never Smoker     Smokeless tobacco: Never Used     Alcohol use Yes      Comment: occassional      History   Drug Use No       REVIEW OF SYSTEMS:                                                    C: NEGATIVE for fever, chills, change in weight  E/M: NEGATIVE for ear, mouth and throat problems  R: NEGATIVE for significant cough or SOB  CV: NEGATIVE for chest pain, palpitations or peripheral edema  GI: NEGATIVE for nausea, abdominal pain, heartburn, or change in bowel habits  M: NEGATIVE for significant arthralgias or myalgia  H: NEGATIVE for bleeding problems  P: NEGATIVE for changes in mood or affect    EXAM:                                                    /66  Pulse 84  Temp 97.6  F (36.4  C) (Oral)  Ht 5' (1.524 m)  Wt 151 lb (68.5 kg)  SpO2 98%  BMI 29.49 kg/m2    GENERAL APPEARANCE: healthy, alert and no distress     EYES: EOMI, PERRL     HENT: ear canals and TM's normal and nose and mouth without ulcers or lesions     NECK: no adenopathy, no asymmetry, masses, or scars and thyroid normal to palpation     RESP: lungs clear to auscultation - no rales, rhonchi or wheezes     CV: irregular rates and rhythm, normal S1 S2 and no click or rub     ABDOMEN:  soft, nontender, no HSM or masses and bowel sounds normal     NEURO: no focal changes      PSYCH: mentation appears normal and affect normal/bright    TSH   Date Value Ref Range Status   10/27/2016 3.06 0.40 - 4.00 mU/L Final     DIAGNOSTICS:                                                      EKG: atrial fibrillation, rate 95, nonspecific ST-T changes  Labs Drawn and in Process:   Unresulted Labs Ordered in the Past 30 Days of this Admission     No orders found from 4/29/2017 to 6/29/2017.          Recent Labs   Lab Test  05/20/17   1030  06/03/16   0905   HGB  16.1*  14.2   PLT  246  307   NA  140  138   POTASSIUM  4.0  4.1   CR  0.72  0.80      IMPRESSION:                                                    Reason for surgery/procedure: treatment of bladder mass.  Proposed procedure: Combined cystoscopy, transurethral resection (TUR) tumor bladder.    The proposed surgical procedure is considered mild risk.    REVISED CARDIAC RISK INDEX  The patient has the following serious cardiovascular risks for perioperative complications such as (MI, PE, VFib and 3  AV Block):  No serious cardiac risks  INTERPRETATION: 1 risks: Class II (low risk - 0.9% complication rate)    The patient has the following additional risks for perioperative complications:  No identified additional risks less age      ICD-10-CM    1. Preop general physical exam Z01.818 Potassium     Hemoglobin     Platelet count     EKG 12-lead complete w/read - Clinics   2. Atrial fibrillation, unspecified type (H) I48.91    3. Essential hypertension with goal blood pressure less than 140/90 I10    4. Other specified hypothyroidism E03.8 TSH with free T4 reflex     (Z01.818) Preop general physical exam  (primary encounter diagnosis)  Comment: as ordered, Cipro as directed night prior to procedure.  Plan: Potassium, Hemoglobin, Platelet count, EKG         12-lead complete w/read - Clinics            (I48.91) Atrial fibrillation, unspecified type (H)  Comment: rate controlled, on ASA, no changes to therapy  Plan: discussed with patient and she wants  no further assessment as is asymptomatic and she will stay on ASA.    (I10) Essential hypertension with goal blood pressure less than 140/90  Comment: stable at goal on therapy  Plan:     (E03.8) Other specified hypothyroidism  Comment: recheck TSH  Plan: TSH with free T4 reflex          RECOMMENDATIONS:                                                      --Consult hospital rounder / IM to assist post-op medical management    Cardiovascular Risk  Performs 4 METs exercise without symptoms (Light housework (dusting, washing dishes), Climb a flight of stairs and Walk on level ground at 15 minutes per mile (4 miles/hour)) .     --Patient is to take all scheduled medications on the day of surgery EXCEPT for modifications listed below.    Anticoagulant or Antiplatelet Medication Use  NSAIDS(ASA)/OTC products:   Stop 5-7 days prior to surgery      APPROVAL GIVEN to proceed with proposed procedure, without further diagnostic evaluation       Signed Electronically by: Lowell Robertson MD    Copy of this evaluation report is provided to requesting physician, Dr. Mcclain.    [place] Preop Guidelines

## 2017-07-05 ENCOUNTER — ANESTHESIA EVENT (OUTPATIENT)
Dept: SURGERY | Facility: CLINIC | Age: 82
End: 2017-07-05
Payer: MEDICARE

## 2017-07-05 NOTE — H&P (VIEW-ONLY)
[facility]  [address] [address]  [phone]  Dept: [phone]    PRE-OP EVALUATION:  Today's date: 2017    Eliana Lambert (: 1925) presents for pre-operative evaluation assessment as requested by Dr. Mcclain.  She requires evaluation and anesthesia risk assessment prior to undergoing surgery/procedure for treatment of bladder mass.  Proposed procedure: Combined cystoscopy, transurethral resection (TUR) tumor bladder.    Date of Surgery/ Procedure: 17  Time of Surgery/ Procedure: 1:15 pm  Hospital/Surgical Facility: [facility]  Primary Physician: Lowell Robertson  Type of Anesthesia Anticipated: General    Patient has a Health Care Directive or Living Will:  YES     HPI:                                                    89 year old female:  Brief HPI related to upcoming procedure: treatment of bladder mass.  Proposed procedure: Combined cystoscopy, transurethral resection (TUR) tumor bladder.    MEDICAL HISTORY:                                                      Patient Active Problem List    Diagnosis Date Noted     Atrial fibrillation, unspecified type (H) 2017     Priority: Medium     Essential hypertension with goal blood pressure less than 140/90 10/27/2016     Priority: Medium     Knee pain, unspecified laterality 10/26/2015     Priority: Medium     Other specified hypothyroidism 10/21/2015     Priority: Medium     Hyperlipidemia LDL goal <130 2014     Priority: Medium     UTI (urinary tract infection) 2014     Priority: Medium      Past Medical History:   Diagnosis Date     Chronic kidney disease      Spider veins      Thyroid disease      Past Surgical History:   Procedure Laterality Date     CHOLECYSTECTOMY       Current Outpatient Prescriptions   Medication Sig Dispense Refill     ciprofloxacin (CIPRO) 500 MG tablet Take 1 tablet (500 mg) by mouth daily Take with evening meal today and evening meal the night before  surgery 2 tablet 0     levothyroxine (SYNTHROID, LEVOTHROID) 25 MCG tablet Take 1 tablet (25 mcg) by mouth daily 90 tablet 3     aspirin 81 MG chewable tablet Take 325 mg by mouth daily        B Complex-C (SUPER B COMPLEX PO)        LECITHIN PO        Cranberry 1000 MG CAPS        calcium carbonate (OS-BRADEN 500 MG [place]. [place]) 500 MG tablet Take 500 mg by mouth 2 times daily       OTC products: None, except as noted above    Allergies   Allergen Reactions     Clindamycin GI Disturbance     Neosporin      Skin reaction     Penicillins      Shrimp      hives     Sulfa Drugs      nausea      Latex Allergy: NO    Social History   Substance Use Topics     Smoking status: Never Smoker     Smokeless tobacco: Never Used     Alcohol use Yes      Comment: occassional      History   Drug Use No       REVIEW OF SYSTEMS:                                                    C: NEGATIVE for fever, chills, change in weight  E/M: NEGATIVE for ear, mouth and throat problems  R: NEGATIVE for significant cough or SOB  CV: NEGATIVE for chest pain, palpitations or peripheral edema  GI: NEGATIVE for nausea, abdominal pain, heartburn, or change in bowel habits  M: NEGATIVE for significant arthralgias or myalgia  H: NEGATIVE for bleeding problems  P: NEGATIVE for changes in mood or affect    EXAM:                                                    /66  Pulse 84  Temp 97.6  F (36.4  C) (Oral)  Ht 5' (1.524 m)  Wt 151 lb (68.5 kg)  SpO2 98%  BMI 29.49 kg/m2    GENERAL APPEARANCE: healthy, alert and no distress     EYES: EOMI, PERRL     HENT: ear canals and TM's normal and nose and mouth without ulcers or lesions     NECK: no adenopathy, no asymmetry, masses, or scars and thyroid normal to palpation     RESP: lungs clear to auscultation - no rales, rhonchi or wheezes     CV: irregular rates and rhythm, normal S1 S2 and no click or rub     ABDOMEN:  soft, nontender, no HSM or masses and bowel sounds normal     NEURO: no focal changes      PSYCH: mentation appears normal and affect normal/bright    TSH   Date Value Ref Range Status   10/27/2016 3.06 0.40 - 4.00 mU/L Final     DIAGNOSTICS:                                                      EKG: atrial fibrillation, rate 95, nonspecific ST-T changes  Labs Drawn and in Process:   Unresulted Labs Ordered in the Past 30 Days of this Admission     No orders found from 4/29/2017 to 6/29/2017.          Recent Labs   Lab Test  05/20/17   1030  06/03/16   0905   HGB  16.1*  14.2   PLT  246  307   NA  140  138   POTASSIUM  4.0  4.1   CR  0.72  0.80      IMPRESSION:                                                    Reason for surgery/procedure: treatment of bladder mass.  Proposed procedure: Combined cystoscopy, transurethral resection (TUR) tumor bladder.    The proposed surgical procedure is considered mild risk.    REVISED CARDIAC RISK INDEX  The patient has the following serious cardiovascular risks for perioperative complications such as (MI, PE, VFib and 3  AV Block):  No serious cardiac risks  INTERPRETATION: 1 risks: Class II (low risk - 0.9% complication rate)    The patient has the following additional risks for perioperative complications:  No identified additional risks less age      ICD-10-CM    1. Preop general physical exam Z01.818 Potassium     Hemoglobin     Platelet count     EKG 12-lead complete w/read - Clinics   2. Atrial fibrillation, unspecified type (H) I48.91    3. Essential hypertension with goal blood pressure less than 140/90 I10    4. Other specified hypothyroidism E03.8 TSH with free T4 reflex     (Z01.818) Preop general physical exam  (primary encounter diagnosis)  Comment: as ordered, Cipro as directed night prior to procedure.  Plan: Potassium, Hemoglobin, Platelet count, EKG         12-lead complete w/read - Clinics            (I48.91) Atrial fibrillation, unspecified type (H)  Comment: rate controlled, on ASA, no changes to therapy  Plan: discussed with patient and she wants  no further assessment as is asymptomatic and she will stay on ASA.    (I10) Essential hypertension with goal blood pressure less than 140/90  Comment: stable at goal on therapy  Plan:     (E03.8) Other specified hypothyroidism  Comment: recheck TSH  Plan: TSH with free T4 reflex          RECOMMENDATIONS:                                                      --Consult hospital rounder / IM to assist post-op medical management    Cardiovascular Risk  Performs 4 METs exercise without symptoms (Light housework (dusting, washing dishes), Climb a flight of stairs and Walk on level ground at 15 minutes per mile (4 miles/hour)) .     --Patient is to take all scheduled medications on the day of surgery EXCEPT for modifications listed below.    Anticoagulant or Antiplatelet Medication Use  NSAIDS(ASA)/OTC products:   Stop 5-7 days prior to surgery      APPROVAL GIVEN to proceed with proposed procedure, without further diagnostic evaluation       Signed Electronically by: Lowell Robertson MD    Copy of this evaluation report is provided to requesting physician, Dr. Mcclain.    [facility] Preop Guidelines

## 2017-07-06 ENCOUNTER — ANESTHESIA (OUTPATIENT)
Dept: SURGERY | Facility: CLINIC | Age: 82
End: 2017-07-06
Payer: MEDICARE

## 2017-07-06 ENCOUNTER — HOSPITAL ENCOUNTER (OUTPATIENT)
Facility: CLINIC | Age: 82
Setting detail: OBSERVATION
Discharge: HOME | End: 2017-07-07
Attending: UROLOGY | Admitting: UROLOGY
Payer: MEDICARE

## 2017-07-06 DIAGNOSIS — N32.89 BLADDER MASS: ICD-10-CM

## 2017-07-06 DIAGNOSIS — C67.2 MALIGNANT NEOPLASM OF LATERAL WALL OF URINARY BLADDER (H): Primary | ICD-10-CM

## 2017-07-06 PROBLEM — C67.9 BLADDER CANCER (H): Status: ACTIVE | Noted: 2017-07-06

## 2017-07-06 PROCEDURE — 25000125 ZZHC RX 250: Performed by: NURSE ANESTHETIST, CERTIFIED REGISTERED

## 2017-07-06 PROCEDURE — 87088 URINE BACTERIA CULTURE: CPT | Performed by: UROLOGY

## 2017-07-06 PROCEDURE — 52240 CYSTOSCOPY AND TREATMENT: CPT | Performed by: UROLOGY

## 2017-07-06 PROCEDURE — 25000125 ZZHC RX 250: Mod: GY | Performed by: UROLOGY

## 2017-07-06 PROCEDURE — 25800025 ZZH RX 258: Performed by: UROLOGY

## 2017-07-06 PROCEDURE — 27210794 ZZH OR GENERAL SUPPLY STERILE: Performed by: UROLOGY

## 2017-07-06 PROCEDURE — 88307 TISSUE EXAM BY PATHOLOGIST: CPT | Mod: 26 | Performed by: UROLOGY

## 2017-07-06 PROCEDURE — 25000128 H RX IP 250 OP 636: Performed by: ANESTHESIOLOGY

## 2017-07-06 PROCEDURE — 87186 SC STD MICRODIL/AGAR DIL: CPT | Performed by: UROLOGY

## 2017-07-06 PROCEDURE — 40000935 ZZH STATISTIC OUTPATIENT (NON-OBS) EVE

## 2017-07-06 PROCEDURE — 37000009 ZZH ANESTHESIA TECHNICAL FEE, EACH ADDTL 15 MIN: Performed by: UROLOGY

## 2017-07-06 PROCEDURE — G0378 HOSPITAL OBSERVATION PER HR: HCPCS

## 2017-07-06 PROCEDURE — 00000159 ZZHCL STATISTIC H-SEND OUTS PREP: Performed by: UROLOGY

## 2017-07-06 PROCEDURE — 71000012 ZZH RECOVERY PHASE 1 LEVEL 1 FIRST HR: Performed by: UROLOGY

## 2017-07-06 PROCEDURE — 25000125 ZZHC RX 250: Performed by: ANESTHESIOLOGY

## 2017-07-06 PROCEDURE — 40000936 ZZH STATISTIC OUTPATIENT (NON-OBS) NIGHT

## 2017-07-06 PROCEDURE — 40000306 ZZH STATISTIC PRE PROC ASSESS II: Performed by: UROLOGY

## 2017-07-06 PROCEDURE — 37000008 ZZH ANESTHESIA TECHNICAL FEE, 1ST 30 MIN: Performed by: UROLOGY

## 2017-07-06 PROCEDURE — 36000058 ZZH SURGERY LEVEL 3 EA 15 ADDTL MIN: Performed by: UROLOGY

## 2017-07-06 PROCEDURE — 88307 TISSUE EXAM BY PATHOLOGIST: CPT | Performed by: UROLOGY

## 2017-07-06 PROCEDURE — 87086 URINE CULTURE/COLONY COUNT: CPT | Performed by: UROLOGY

## 2017-07-06 PROCEDURE — 36000056 ZZH SURGERY LEVEL 3 1ST 30 MIN: Performed by: UROLOGY

## 2017-07-06 PROCEDURE — 25000566 ZZH SEVOFLURANE, EA 15 MIN: Performed by: UROLOGY

## 2017-07-06 PROCEDURE — 25000128 H RX IP 250 OP 636: Performed by: NURSE ANESTHETIST, CERTIFIED REGISTERED

## 2017-07-06 PROCEDURE — A9270 NON-COVERED ITEM OR SERVICE: HCPCS | Mod: GY | Performed by: UROLOGY

## 2017-07-06 RX ORDER — GLYCOPYRROLATE 0.2 MG/ML
INJECTION, SOLUTION INTRAMUSCULAR; INTRAVENOUS PRN
Status: DISCONTINUED | OUTPATIENT
Start: 2017-07-06 | End: 2017-07-06

## 2017-07-06 RX ORDER — HYDROMORPHONE HYDROCHLORIDE 1 MG/ML
.3-.5 INJECTION, SOLUTION INTRAMUSCULAR; INTRAVENOUS; SUBCUTANEOUS EVERY 5 MIN PRN
Status: DISCONTINUED | OUTPATIENT
Start: 2017-07-06 | End: 2017-07-06 | Stop reason: HOSPADM

## 2017-07-06 RX ORDER — SORBITOL 30 G/1000ML
IRRIGANT IRRIGATION PRN
Status: DISCONTINUED | OUTPATIENT
Start: 2017-07-06 | End: 2017-07-06 | Stop reason: HOSPADM

## 2017-07-06 RX ORDER — PROPOFOL 10 MG/ML
INJECTION, EMULSION INTRAVENOUS PRN
Status: DISCONTINUED | OUTPATIENT
Start: 2017-07-06 | End: 2017-07-06

## 2017-07-06 RX ORDER — NALOXONE HYDROCHLORIDE 0.4 MG/ML
.1-.4 INJECTION, SOLUTION INTRAMUSCULAR; INTRAVENOUS; SUBCUTANEOUS
Status: DISCONTINUED | OUTPATIENT
Start: 2017-07-06 | End: 2017-07-07 | Stop reason: HOSPADM

## 2017-07-06 RX ORDER — LIDOCAINE 40 MG/G
CREAM TOPICAL
Status: DISCONTINUED | OUTPATIENT
Start: 2017-07-06 | End: 2017-07-06 | Stop reason: HOSPADM

## 2017-07-06 RX ORDER — SODIUM CHLORIDE, SODIUM LACTATE, POTASSIUM CHLORIDE, CALCIUM CHLORIDE 600; 310; 30; 20 MG/100ML; MG/100ML; MG/100ML; MG/100ML
INJECTION, SOLUTION INTRAVENOUS CONTINUOUS
Status: DISCONTINUED | OUTPATIENT
Start: 2017-07-06 | End: 2017-07-06 | Stop reason: HOSPADM

## 2017-07-06 RX ORDER — LEVOFLOXACIN 5 MG/ML
INJECTION, SOLUTION INTRAVENOUS PRN
Status: DISCONTINUED | OUTPATIENT
Start: 2017-07-06 | End: 2017-07-06

## 2017-07-06 RX ORDER — ONDANSETRON 2 MG/ML
4 INJECTION INTRAMUSCULAR; INTRAVENOUS EVERY 6 HOURS PRN
Status: DISCONTINUED | OUTPATIENT
Start: 2017-07-06 | End: 2017-07-07 | Stop reason: HOSPADM

## 2017-07-06 RX ORDER — OXYCODONE AND ACETAMINOPHEN 5; 325 MG/1; MG/1
1 TABLET ORAL EVERY 4 HOURS PRN
Status: DISCONTINUED | OUTPATIENT
Start: 2017-07-06 | End: 2017-07-07 | Stop reason: HOSPADM

## 2017-07-06 RX ORDER — ONDANSETRON 2 MG/ML
INJECTION INTRAMUSCULAR; INTRAVENOUS PRN
Status: DISCONTINUED | OUTPATIENT
Start: 2017-07-06 | End: 2017-07-06

## 2017-07-06 RX ORDER — FENTANYL CITRATE 50 UG/ML
25-50 INJECTION, SOLUTION INTRAMUSCULAR; INTRAVENOUS
Status: DISCONTINUED | OUTPATIENT
Start: 2017-07-06 | End: 2017-07-06 | Stop reason: HOSPADM

## 2017-07-06 RX ORDER — HYDRALAZINE HYDROCHLORIDE 20 MG/ML
2.5-5 INJECTION INTRAMUSCULAR; INTRAVENOUS EVERY 10 MIN PRN
Status: DISCONTINUED | OUTPATIENT
Start: 2017-07-06 | End: 2017-07-06 | Stop reason: HOSPADM

## 2017-07-06 RX ORDER — LABETALOL HYDROCHLORIDE 5 MG/ML
10 INJECTION, SOLUTION INTRAVENOUS
Status: DISCONTINUED | OUTPATIENT
Start: 2017-07-06 | End: 2017-07-06 | Stop reason: HOSPADM

## 2017-07-06 RX ORDER — ONDANSETRON 2 MG/ML
4 INJECTION INTRAMUSCULAR; INTRAVENOUS EVERY 30 MIN PRN
Status: DISCONTINUED | OUTPATIENT
Start: 2017-07-06 | End: 2017-07-06 | Stop reason: HOSPADM

## 2017-07-06 RX ORDER — DEXTROSE MONOHYDRATE, SODIUM CHLORIDE, AND POTASSIUM CHLORIDE 50; 1.49; 4.5 G/1000ML; G/1000ML; G/1000ML
INJECTION, SOLUTION INTRAVENOUS CONTINUOUS
Status: DISCONTINUED | OUTPATIENT
Start: 2017-07-06 | End: 2017-07-07 | Stop reason: HOSPADM

## 2017-07-06 RX ORDER — ONDANSETRON 4 MG/1
4 TABLET, ORALLY DISINTEGRATING ORAL EVERY 6 HOURS PRN
Status: DISCONTINUED | OUTPATIENT
Start: 2017-07-06 | End: 2017-07-07 | Stop reason: HOSPADM

## 2017-07-06 RX ORDER — DIMENHYDRINATE 50 MG/ML
25 INJECTION, SOLUTION INTRAMUSCULAR; INTRAVENOUS
Status: DISCONTINUED | OUTPATIENT
Start: 2017-07-06 | End: 2017-07-06 | Stop reason: HOSPADM

## 2017-07-06 RX ORDER — DEXAMETHASONE SODIUM PHOSPHATE 4 MG/ML
INJECTION, SOLUTION INTRA-ARTICULAR; INTRALESIONAL; INTRAMUSCULAR; INTRAVENOUS; SOFT TISSUE PRN
Status: DISCONTINUED | OUTPATIENT
Start: 2017-07-06 | End: 2017-07-06

## 2017-07-06 RX ORDER — LEVOTHYROXINE SODIUM 25 UG/1
25 TABLET ORAL DAILY
Status: DISCONTINUED | OUTPATIENT
Start: 2017-07-07 | End: 2017-07-07 | Stop reason: HOSPADM

## 2017-07-06 RX ORDER — ONDANSETRON 4 MG/1
4 TABLET, ORALLY DISINTEGRATING ORAL EVERY 30 MIN PRN
Status: DISCONTINUED | OUTPATIENT
Start: 2017-07-06 | End: 2017-07-06 | Stop reason: HOSPADM

## 2017-07-06 RX ORDER — FENTANYL CITRATE 50 UG/ML
INJECTION, SOLUTION INTRAMUSCULAR; INTRAVENOUS PRN
Status: DISCONTINUED | OUTPATIENT
Start: 2017-07-06 | End: 2017-07-06

## 2017-07-06 RX ADMIN — PROPOFOL 110 MG: 10 INJECTION, EMULSION INTRAVENOUS at 13:44

## 2017-07-06 RX ADMIN — LEVOFLOXACIN 250 MG: 5 INJECTION, SOLUTION INTRAVENOUS at 14:02

## 2017-07-06 RX ADMIN — ONDANSETRON 4 MG: 2 INJECTION INTRAMUSCULAR; INTRAVENOUS at 13:44

## 2017-07-06 RX ADMIN — GLYCOPYRROLATE 0.1 MG: 0.2 INJECTION, SOLUTION INTRAMUSCULAR; INTRAVENOUS at 13:44

## 2017-07-06 RX ADMIN — PHENYLEPHRINE HYDROCHLORIDE 100 MCG: 10 INJECTION, SOLUTION INTRAMUSCULAR; INTRAVENOUS; SUBCUTANEOUS at 13:50

## 2017-07-06 RX ADMIN — Medication 50 MG: at 13:44

## 2017-07-06 RX ADMIN — FENTANYL CITRATE 100 MCG: 50 INJECTION, SOLUTION INTRAMUSCULAR; INTRAVENOUS at 13:43

## 2017-07-06 RX ADMIN — SODIUM CHLORIDE, POTASSIUM CHLORIDE, SODIUM LACTATE AND CALCIUM CHLORIDE: 600; 310; 30; 20 INJECTION, SOLUTION INTRAVENOUS at 13:38

## 2017-07-06 RX ADMIN — ONDANSETRON 4 MG: 2 INJECTION INTRAMUSCULAR; INTRAVENOUS at 14:34

## 2017-07-06 RX ADMIN — DEXAMETHASONE SODIUM PHOSPHATE 4 MG: 4 INJECTION, SOLUTION INTRA-ARTICULAR; INTRALESIONAL; INTRAMUSCULAR; INTRAVENOUS; SOFT TISSUE at 13:44

## 2017-07-06 RX ADMIN — POTASSIUM CHLORIDE, DEXTROSE MONOHYDRATE AND SODIUM CHLORIDE: 150; 5; 450 INJECTION, SOLUTION INTRAVENOUS at 20:13

## 2017-07-06 RX ADMIN — PHENYLEPHRINE HYDROCHLORIDE 100 MCG: 10 INJECTION, SOLUTION INTRAMUSCULAR; INTRAVENOUS; SUBCUTANEOUS at 13:53

## 2017-07-06 RX ADMIN — FENTANYL CITRATE 25 MCG: 50 INJECTION INTRAMUSCULAR; INTRAVENOUS at 15:21

## 2017-07-06 RX ADMIN — FENTANYL CITRATE 25 MCG: 50 INJECTION INTRAMUSCULAR; INTRAVENOUS at 15:33

## 2017-07-06 RX ADMIN — SODIUM CHLORIDE, POTASSIUM CHLORIDE, SODIUM LACTATE AND CALCIUM CHLORIDE: 600; 310; 30; 20 INJECTION, SOLUTION INTRAVENOUS at 15:07

## 2017-07-06 ASSESSMENT — ENCOUNTER SYMPTOMS
STRIDOR: 0
DYSRHYTHMIAS: 1
SEIZURES: 0

## 2017-07-06 ASSESSMENT — LIFESTYLE VARIABLES: TOBACCO_USE: 0

## 2017-07-06 ASSESSMENT — COPD QUESTIONNAIRES: COPD: 0

## 2017-07-06 NOTE — PROVIDER NOTIFICATION
While in recovery, noted HR to be anywhere from low 90's to 105.  Irregular at baseline due to A. Fib.  Dr. Wheatley aware, no new orders since all other VS stable.

## 2017-07-06 NOTE — IP AVS SNAPSHOT
MRN:[id number]                      After Visit Summary   7/6/2017    Eliana Lambert    MRN: [id number]           Thank you!     Thank you for choosing [facility] for your care. Our goal is always to provide you with excellent care. Hearing back from our patients is one way we can continue to improve our services. Please take a few minutes to complete the written survey that you may receive in the mail after you visit. If you would like to speak to someone directly about your visit please contact Patient Relations at [phone]. Thank you!          Patient Information     Date Of Birth          9/13/1925        About your hospital stay     You were admitted on:  July 6, 2017 You last received care in the:  [facility] Observation Department    You were discharged on:  July 7, 2017        Reason for your hospital stay       Surgery                  Who to Call     For medical emergencies, please call [phone].  For non-urgent questions about your medical care, please call your primary care provider or clinic, [phone]  For questions related to your surgery, please call your surgery clinic        Attending Provider     Provider Specialty    Artemio Mcclain MD Urology       Primary Care Provider Office Phone # Fax #    Lowell Robertson [phone]       When to contact your care team       Fevers, chills, nausea, vomiting, uncontrolled pain, passing large blood clots.   [phone]                  After Care Instructions     Activity       Your activity upon discharge: No lifting >20 lbs until your post op appt.            Diet       Follow this diet upon discharge: Regular            NO lifting       NO lifting over  5  lbs and no strenuous physical activity for  6  weeks.            Tubes and drains       You are going home with the following tubes or drains: gonzalez catheter.  Tube cares per hospital or home care instructions                 "  Follow-up Appointments     Follow-up and recommended labs and tests       As scheduled with Dr. Mcclain on Tues.  Do not resume your daily aspirin until you see Dr. Mcclain.                  Your next 10 appointments already scheduled     Jul 11, 2017 11:40 AM CDT   Post-Op with Artemio Mcclain MD   [facility] ([facility])    [address]  [address]  [facility] [id number] [phone]            Jul 21, 2017  2:30 PM CDT   Cystoscopy with Artemio Mcclain MD,  CYF   [facility] ([facility])    [address]  [address]  [facility] [address] [phone]                         Pending Results     Date and Time Order Name Status Description    7/6/2017 1423 Surgical pathology exam In process     7/6/2017 1400 Urine Culture Aerobic Bacterial Preliminary             Statement of Approval     Ordered          07/07/17 1435  I have reviewed and agree with all the recommendations and orders detailed in this document.  EFFECTIVE NOW     Approved and electronically signed by:  Artemio Mcclain MD             Admission Information     Date & Time Provider Department Dept. Phone    7/6/2017 Artemio Mcclain MD [facility] Observation Department [phone]      Your Vitals Were     Blood Pressure Pulse Temperature Respirations Height Weight    103/58 (BP Location: Right arm) 86 95.9  F (35.5  C) (Oral) 16 1.524 m (5') 67.1 kg (148 lb)    Pulse Oximetry BMI (Body Mass Index)                98% 28.9 kg/m2          [employer] Information     [employer] lets you send messages to your doctor, view your test results, renew your prescriptions, schedule appointments and more. To sign up, go to [email or web address] . Click on \"Log in\" on the left side of the screen, which will take you to the Welcome page. Then click on \"Sign up Now\" on the right side of the page.     You will be " asked to enter the access code listed below, as well as some personal information. Please follow the directions to create your username and password.     Your access code is: [email or web address]  Expires: 2017 12:17 PM     Your access code will  in 90 days. If you need help or a new code, please call your [facility] clinic or [phone].        Care EveryWhere ID     This is your Care EveryWhere ID. This could be used by other organizations to access your [facility] medical records  [id number]        Equal Access to Services     [facility]: Hadbola smith Sotrinidad, waaxrodrigo lujanetadaha, qaybsherly phippsalpetrona wright, estevan jay . So [facility] [phone].    ATENCIÓN: Si habla español, tiene a esquivel disposición servicios gratuitos de asistencia lingüística. Rashad al [phone].    We comply with applicable federal civil rights laws and [place] laws. We do not discriminate on the basis of race, color, national origin, age, disability sex, sexual orientation or gender identity.               Review of your medicines      CONTINUE these medicines which may have CHANGED, or have new prescriptions. If we are uncertain of the size of tablets/capsules you have at home, strength may be listed as something that might have changed.        Dose / Directions    ciprofloxacin 250 MG tablet   Commonly known as:  CIPRO   This may have changed:    - medication strength  - how much to take  - additional instructions   Used for:  Bladder mass        Dose:  250 mg   Take 1 tablet (250 mg) by mouth daily   Quantity:  7 tablet   Refills:  0         CONTINUE these medicines which have NOT CHANGED        Dose / Directions    aspirin 81 MG chewable tablet        Dose:  325 mg   Take 325 mg by mouth daily   Refills:  0       calcium carbonate 1250 MG tablet   Commonly known as:  OS-BRADEN 500 mg [place]. [place]        Dose:  500 mg   Take 500 mg by mouth 2 times daily   Refills:  0       Cranberry 1000 MG Caps         Dose:  1 capsule   Take 1 capsule by mouth daily   Refills:  0       LECITHIN PO        Dose:  1 tablet   Take 1 tablet by mouth daily   Refills:  0       levothyroxine 25 MCG tablet   Commonly known as:  SYNTHROID/LEVOTHROID   Used for:  Other specified hypothyroidism        Dose:  25 mcg   Take 1 tablet (25 mcg) by mouth daily   Quantity:  90 tablet   Refills:  3       SUPER B COMPLEX PO        Dose:  1 tablet   Take 1 tablet by mouth daily   Refills:  0            Where to get your medicines      [place] [facility] [place] [address] [facility]  [address] [facility] [address]     Phone:  [phone]     ciprofloxacin 250 MG tablet                Protect others around you: Learn how to safely use, store and throw away your medicines at www.disposemymeds.org.             Medication List: This is a list of all your medications and when to take them. Check marks below indicate your daily home schedule. Keep this list as a reference.      Medications           Morning Afternoon Evening Bedtime As Needed    aspirin 81 MG chewable tablet   Take 325 mg by mouth daily                                calcium carbonate 1250 MG tablet   Commonly known as:  OS-BRADEN 500 mg [place]. [place]   Take 500 mg by mouth 2 times daily                                ciprofloxacin 250 MG tablet   Commonly known as:  CIPRO   Take 1 tablet (250 mg) by mouth daily                                Cranberry 1000 MG Caps   Take 1 capsule by mouth daily                                LECITHIN PO   Take 1 tablet by mouth daily                                levothyroxine 25 MCG tablet   Commonly known as:  SYNTHROID/LEVOTHROID   Take 1 tablet (25 mcg) by mouth daily   Last time this was given:  25 mcg on 7/7/2017  9:37 AM                                SUPER B COMPLEX PO   Take 1 tablet by mouth daily

## 2017-07-06 NOTE — OR NURSING
[facility] PACU-to-NURSING HANDOFF    Procedure:  Procedure(s):  VIDEO CYSTOSCOPY, TRANSURETHRAL RESECTION (TUR) LARGE TUMOR BLADDER - Wound Class: II-Clean Contaminated   {NON-OPERATIVE PROCEDURES:[id number]  Last Vitals: Blood pressure 99/78, pulse 101, temperature 98.4  F (36.9  C), temperature source Temporal, resp. rate 12, height 1.524 m (5'), weight 67.1 kg (148 lb), SpO2 98 %, not currently breastfeeding.   Reason patient is transferring to unit: requires extended recovery for bladder monitoring   Can discharge home once goals are met: Yes.   Prescriptions: No new prescriptions written    Pain Management:: No pain medication given  Pain Control: Good     Incision Site: Clean, dry, and intact  Antiemetic Management: none  IV Fluid total: 1100 mL  Elimination Status: Urethral catheter (gonzalez) in place; orders for patient to discharge with gonzalez       Discharge Teaching: Not applicable  Patient has a Ride Home:  Yes.   Patient has someone to stay with them 24 hours after the procedure:  Yes.     PACU Nurse Name/Phone Number: Sarina Dave,   3:46 PM    Above PACU Nurse Handoff Report was reviewed: Yes  Reviewed by:

## 2017-07-06 NOTE — OR NURSING
Patient reports she forgot to take the oral cipro last evening that Dr. Mcclain had prescribed for her to take.  Dr. Mcclain notified.  No new orders.

## 2017-07-06 NOTE — PLAN OF CARE
Problem: Discharge Planning  Goal: Discharge Planning (Adult, OB, Behavioral, Peds)  Outcome: Improving  PRIMARY DIAGNOSIS/PROCEDURE: cystoscopy/TUR of bladder tumor  OUTPATIENT/OBSERVATION GOALS TO BE MET BEFORE DISCHARGE:     1. Stable vital signs Yes  2. Tolerating diet:No, has not ate yet, denies nausea.   3. Pain controlled with oral pain medications:  Pt currently reports mild pressure in bladder.   4. Positive bowel sounds:  Yes, hypoactive  5. Voiding without difficulty:  No, gonzalez catheter in place, CBI running at moderate rate, urine clear/light pink  6. Able to ambulate:  Dangle at bedside.  7. Provider specific discharge goals met:  No     Vitals stable, pt reports mild pressure in bladder. CBI running at moderate rate, urine clear/light pink. IVF infusing. Denies nausea, advanced to regular diet.

## 2017-07-06 NOTE — ANESTHESIA PREPROCEDURE EVALUATION
Anesthesia Evaluation     . Pt has had prior anesthetic. Type: General    No history of anesthetic complications          ROS/MED HX    ENT/Pulmonary:     (+)PERLA risk factors hypertension, , . .   (-) tobacco use, asthma, COPD and recent URI   Neurologic:  - neg neurologic ROS    (-) seizures and CVA   Cardiovascular:     (+) Dyslipidemia, hypertension----. Taking blood thinners : . . . :. dysrhythmias a-fib, . Previous cardiac testing date:results:date: results:ECG reviewed date:6/17 results:A-fib rate 95 date: results:         (-) CAD and valvular problems/murmursCHF: on ASA for a-fib.   METS/Exercise Tolerance:     Hematologic: Comments: Lab Test        06/28/17 05/20/17 06/03/16      --          05/11/14                       1037          1030          0905           --           1045          WBC           --          7.9          7.7           --          8.6           HGB          14.6         16.1*        14.2           < >        13.2          MCV           --          93           89            --          91            PLT          280          246          307           --          356            < > = values in this interval not displayed.                  Lab Test        06/28/17     05/20/17     06/03/16     10/26/15                       1037          1030          0905          0907          NA            --          140          138          137           POTASSIUM    4.6          4.0          4.1          4.4           CHLORIDE      --          106          104          104           CO2           --          27           25           29            BUN           --          22           17           18            CR            --          0.72         0.80         0.90          ANIONGAP      --          7            9            4             BRADEN           --          9.3          9.2          9.2           GLC           --          91           88           91          Lab Test         06/28/17 05/20/17 06/03/16      --          05/11/14                       1037          1030          0905           --           1045          WBC           --          7.9          7.7           --          8.6           HGB          14.6         16.1*        14.2           < >        13.2          MCV           --          93           89            --          91            PLT          280          246          307           --          356            < > = values in this interval not displayed.                  Lab Test        06/28/17     05/20/17     06/03/16     10/26/15                       1037          1030          0905          0907          NA            --          140          138          137           POTASSIUM    4.6          4.0          4.1          4.4           CHLORIDE      --          106          104          104           CO2           --          27           25           29            BUN           --          22           17           18            CR            --          0.72         0.80         0.90          ANIONGAP      --          7            9            4             BRADEN           --          9.3          9.2          9.2           GLC           --          91           88           91           - neg hematologic  ROS       Musculoskeletal:  - neg musculoskeletal ROS       GI/Hepatic:  - neg GI/hepatic ROS      (-) GERD, hepatitis and liver disease   Renal/Genitourinary:     (+) chronic renal disease, type: CRI, Pt does not require dialysis, Pt has no history of transplant,       Endo:     (+) thyroid problem hypothyroidism, .   (-) Type I DM and Type II DM   Psychiatric:  - neg psychiatric ROS       Infectious Disease:  - neg infectious disease ROS       Malignancy:      - no malignancy   Other:    - neg other ROS                 Physical Exam  Normal systems: cardiovascular, pulmonary and dental    Airway   Mallampati: II  TM distance: >3 FB  Neck ROM:  full    Dental   (+) lower dentures and upper dentures    Cardiovascular   Rhythm and rate: irregular and normal  (-) no friction rub, no systolic click and no murmur    Pulmonary    breath sounds clear to auscultation(-) no rhonchi, no decreased breath sounds, no wheezes, no rales and no stridor                    Anesthesia Plan      History & Physical Review  History and physical reviewed and following examination; no interval change.    ASA Status:  3 .    NPO Status:  > 8 hours    Plan for General and LMA with Intravenous induction.   PONV prophylaxis:  Ondansetron (or other 5HT-3) and Dexamethasone or Solumedrol       Postoperative Care  Postoperative pain management:  IV analgesics.      Consents  Anesthetic plan, risks, benefits and alternatives discussed with:  Patient and Daughter/Son..                          .

## 2017-07-06 NOTE — OP NOTE
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 Laryngeal mask  Estimated blood loss: 50 cc    Indications: The patient is a  89-year-old female with gross hematuria and has a solid mass in the bladder on CT scan. Flexible cystoscopy in the office revealed a large papillary tumor involving the right lateral wall and possibly the right ureteral orifice and bladder neck. The procedure, alternatives, risks and follow-up were discussed with the patient and her daughter. Urinary incontinence postoperatively was discussed as a possibility    Procedure:the patient received Levaquin 250 mg IV after being taken to the cystoscopy suite and placed in the supine position. After adequate general laryngeal mask anesthesia the patient was placed in the  Lithotomy position and her genitalia were prepped and draped in a sterile fashion. A 26 [place] Storz resectoscope sheath  With [place] obturator was inserted in the bladder carefully and residual urine was sent for  Culture. Using the 30 lens, video and 3% sorbitol solution her bladder tumor at the bladder neck and right lateral wall were inspected and the position of the right and left ureteral orifices confirmed. The right ureteral orifice was a centimeter or more away from the right lateral wall tumor and free of disease.  The right lateral wall tumor was  resected down into superficial muscularis and arterial bleeders cauterized  At that level. Tumor of the bladder neck was completely resected and did not Approach the mid urethra.. All tumor was evacuated using the resectoscope and irrigation and sent to pathology in formalin.  The resected areas were then cauterized for hemostasis No perforations were seen. All visible tumor was resected. Prior to resection, there was no bladder induration or mass palpable on vaginal exam.  The resectoscope was  removed and a 20 K[place]wanda three-way Goff  Was inserted into the bladder. The Goff was placed on slow continuous normal saline irrigation and placed to closed gravity drainage and secured to the patient's thigh with a Velcro strap without traction. 15 cc was placed on the Goff balloon. The patient received a B&O suppository and was taken to recovery in stable condition Estimated blood loss was 50 cc.

## 2017-07-06 NOTE — IP AVS SNAPSHOT
[facility] Observation Department    [address]    [facility] [address]    Phone:  [phone]                                       After Visit Summary   7/6/2017    Eliana Lambert    MRN: [id number]           After Visit Summary Signature Page     I have received my discharge instructions, and my questions have been answered. I have discussed any challenges I see with this plan with the nurse or doctor.    ..........................................................................................................................................  Patient/Patient Representative Signature      ..........................................................................................................................................  Patient Representative Print Name and Relationship to Patient    ..................................................               ................................................  Date                                            Time    ..........................................................................................................................................  Reviewed by Signature/Title    ...................................................              ..............................................  Date                                                            Time

## 2017-07-06 NOTE — ANESTHESIA POSTPROCEDURE EVALUATION
Patient: Eliana Lambert    Procedure(s):  VIDEO CYSTOSCOPY, TRANSURETHRAL RESECTION (TUR) LARGE TUMOR BLADDER - Wound Class: II-Clean Contaminated    Diagnosis:Bladder tumor  Diagnosis Additional Information: Transitional cell cancer of bladder    Anesthesia Type:  General, LMA    Note:  Anesthesia Post Evaluation    Patient location during evaluation: PACU  Patient participation: Able to fully participate in evaluation  Level of consciousness: awake  Pain management: adequate  Airway patency: patent  Cardiovascular status: acceptable  Respiratory status: acceptable  Hydration status: acceptable  PONV: controlled     Anesthetic complications: None          Last vitals:  Vitals:    07/06/17 1505 07/06/17 1510 07/06/17 1521   BP: 112/73     Pulse:      Resp: 14 15 16   Temp:   98.6  F (37  C)   SpO2: 100% 100% 98%         Electronically Signed By: Armin Wheatley MD  July 6, 2017  3:28 PM

## 2017-07-06 NOTE — BRIEF OP NOTE
[facility] Brief Operative Note    Pre-operative diagnosis: Bladder tumor   Post-operative diagnosis TCC bladder     Procedure: Procedure(s):  VIDEO CYSTOSCOPY, TRANSURETHRAL RESECTION (TUR) LARGE TUMOR BLADDER - Wound Class: II-Clean Contaminated   Surgeon(s): Surgeon(s) and Role: Artemio Mcclain MD - Primary   Estimated blood loss: 50 mL    Specimens:   ID Type Source Tests Collected by Time Destination   1 :  Urine Bladder URINE CULTURE AEROBIC BACTERIAL Artemio Mcclain MD 7/6/2017  1:59 PM    A : Bladder tumor Tissue Bladder SURGICAL PATHOLOGY EXAM Artemio Mcclain MD 7/6/2017  2:21 PM       Findings: involves bladder neck

## 2017-07-06 NOTE — PHARMACY-ADMISSION MEDICATION HISTORY
PTA meds completed by pre-admitting nurse ( Leidy Dumont RN ). No further clarifications required by pharmacy.    Prior to Admission medications    Medication Sig Last Dose Taking? Auth Provider   ciprofloxacin (CIPRO) 500 MG tablet Take 1 tablet (500 mg) by mouth daily Take with evening meal today and evening meal the night before surgery 7/4/2017 Yes Artemio Mcclain MD   levothyroxine (SYNTHROID, LEVOTHROID) 25 MCG tablet Take 1 tablet (25 mcg) by mouth daily 7/6/2017 at Unknown time Yes Lowell Robertson MD   aspirin 81 MG chewable tablet Take 325 mg by mouth daily  6/26/2017 Yes Reported, Patient   B Complex-C (SUPER B COMPLEX PO) Take 1 tablet by mouth daily  6/29/2017 Yes Reported, Patient   LECITHIN PO Take 1 tablet by mouth daily  6/29/2017 Yes Reported, Patient   Cranberry 1000 MG CAPS Take 1 capsule by mouth daily  6/29/2017 Yes Reported, Patient   calcium carbonate (OS-BRADEN 500 MG [place]. [place]) 500 MG tablet Take 500 mg by mouth 2 times daily 6/29/2017 Yes Reported, Patient

## 2017-07-06 NOTE — ANESTHESIA CARE TRANSFER NOTE
Patient: Eliana Lambret    Procedure(s):  VIDEO CYSTOSCOPY, TRANSURETHRAL RESECTION (TUR) LARGE TUMOR BLADDER - Wound Class: II-Clean Contaminated    Diagnosis: Bladder tumor  Diagnosis Additional Information: No value filed.    Anesthesia Type:   General, LMA     Note:  Airway :Face Mask  Patient transferred to:PACU  Comments: Adequate spontaneous respirations.      Vitals: (Last set prior to Anesthesia Care Transfer)    CRNA VITALS  7/6/2017 1416 - 7/6/2017 1451      7/6/2017             Pulse: 121    SpO2: 99 %                Electronically Signed By: JEFFY Johnson CRNA  July 6, 2017  2:51 PM

## 2017-07-07 VITALS
TEMPERATURE: 95.9 F | WEIGHT: 148 LBS | OXYGEN SATURATION: 98 % | HEART RATE: 86 BPM | BODY MASS INDEX: 29.06 KG/M2 | HEIGHT: 60 IN | RESPIRATION RATE: 16 BRPM | SYSTOLIC BLOOD PRESSURE: 103 MMHG | DIASTOLIC BLOOD PRESSURE: 58 MMHG

## 2017-07-07 LAB
COPATH REPORT: NORMAL
GLUCOSE BLDC GLUCOMTR-MCNC: 130 MG/DL (ref 70–99)

## 2017-07-07 PROCEDURE — 25000132 ZZH RX MED GY IP 250 OP 250 PS 637: Mod: GY | Performed by: UROLOGY

## 2017-07-07 PROCEDURE — 40000934 ZZH STATISTIC OUTPATIENT (NON-OBS) DAY

## 2017-07-07 PROCEDURE — A9270 NON-COVERED ITEM OR SERVICE: HCPCS | Mod: GY | Performed by: UROLOGY

## 2017-07-07 PROCEDURE — 00000146 ZZHCL STATISTIC GLUCOSE BY METER IP

## 2017-07-07 PROCEDURE — G0378 HOSPITAL OBSERVATION PER HR: HCPCS

## 2017-07-07 PROCEDURE — 25800025 ZZH RX 258: Performed by: UROLOGY

## 2017-07-07 RX ORDER — CIPROFLOXACIN 250 MG/1
250 TABLET, FILM COATED ORAL DAILY
Qty: 7 TABLET | Refills: 0 | Status: SHIPPED | OUTPATIENT
Start: 2017-07-07 | End: 2017-07-26

## 2017-07-07 RX ADMIN — LEVOTHYROXINE SODIUM 25 MCG: 25 TABLET ORAL at 09:37

## 2017-07-07 RX ADMIN — POTASSIUM CHLORIDE, DEXTROSE MONOHYDRATE AND SODIUM CHLORIDE: 150; 5; 450 INJECTION, SOLUTION INTRAVENOUS at 05:36

## 2017-07-07 NOTE — PLAN OF CARE
Problem: Discharge Planning  Goal: Discharge Planning (Adult, OB, Behavioral, Peds)  Outcome: Improving  PRIMARY DIAGNOSIS/PROCEDURE: cystoscopy/TUR of bladder tumor  OUTPATIENT/OBSERVATION GOALS TO BE MET BEFORE DISCHARGE:      1. Stable vital signs Yes  2. Tolerating diet: Yes, tolerating regular diet  3. Pain controlled with oral pain medications:  denies pain/pressure.   4. Positive bowel sounds:  Yes, hypoactive  5. Voiding without difficulty:  No, gonzalez catheter in place, CBI running at moderate rate, urine clear/light pink  6. Able to ambulate:  Walked in halls, tolerated well  7. Provider specific discharge goals met:  Has met all goals besides gonzalez/urine      Vitals stable, pt denies pain/pressure in bladder. Tolerated regular diet for dinner. Walked in halls x1. CBI running at slow rate, urine light pink and clear.

## 2017-07-07 NOTE — PROGRESS NOTES
PRIMARY DIAGNOSIS: Cystoscopy, TURBT  OUTPATIENT/OBSERVATION GOALS TO BE MET BEFORE DISCHARGE:  1. ADLs back to baseline: Yes    2. Activity and level of assistance: Up with standby assistance.    3. Pain status: Pain free.    4. Return to near baseline physical activity: Yes     Discharge Planner Nurse   Safe discharge environment identified: Yes  Barriers to discharge: No  VSS. Pt denies pain, resting in bed between cares. C/O difficulty sleeping, offered ear plugs, refused. CBI running, slowed, urine clear, pale yellow and free of clots. IVF infusing at 100 ml/hr. Bed alarms in use, pt not attempting to exit bed. Alert and oriented, able to make needs known. Continue to monitor.       Entered by: Minal Abdullahi 07/07/2017 5:19 AM     Please review provider order for any additional goals.   Nurse to notify provider when observation goals have been met and patient is ready for discharge.

## 2017-07-07 NOTE — PROGRESS NOTES
PRIMARY DIAGNOSIS:Cystoscopy, TURBT  OUTPATIENT/OBSERVATION GOALS TO BE MET BEFORE DISCHARGE:  1. ADLs back to baseline: Yes    2. Activity and level of assistance: Up with standby assistance.    3. Pain status: Pain free.    4. Return to near baseline physical activity: Yes     Discharge Planner Nurse   Safe discharge environment identified: Yes  Barriers to discharge: Yes, supplies and education needed    VSS on RA. Pt denies pain, n/v. Only c/o difficulty sleeping. BS+, not yet passing flatus. Tolerating regular diet. PIV infusing IVF at 100 ml/hr. CBI infusing at slow rate, clear pale pink urine free of clots. Bed rails x3 per pt request. Bed alarms in use, pt not attempting to exit bed. Alert and oriented, able to make needs known. Continue to monitor.       Entered by: Minal Abdullahi 07/07/2017 12:06 AM     Please review provider order for any additional goals.   Nurse to notify provider when observation goals have been met and patient is ready for discharge.

## 2017-07-07 NOTE — PLAN OF CARE
"Problem: Discharge Planning  Goal: Discharge Planning (Adult, OB, Behavioral, Peds)  PRIMARY DIAGNOSIS: \"GENERIC\" NURSING  OUTPATIENT/OBSERVATION GOALS TO BE MET BEFORE DISCHARGE:  1. ADLs back to baseline: Yes      2. Activity and level of assistance: Up with standby assistance.      3. Pain status: Pain free.      4. Return to near baseline physical activity: Yes          Discharge Planner Nurse   Safe discharge environment identified: Yes  Barriers to discharge: Yes, will need family member to stay with patient at home for 24 hours.       Entered by: Abi Gaffney 07/07/2017 1:48 PM     Please review provider order for any additional goals.   Nurse to notify provider when observation goals have been met and patient is ready for discharge.     Patient A&Ox4, up ambulating with stand by assist. Patient is pain free. Is passing gas, last BM today. Urine clear, with pink sediment. Anticipated discharge home, daughter lives close, but unable to stay with patient. Daughter and son express concern about patient going home alone and managing catheter. Urine culture back with gram negative rods. Patient to discharge home on ciprofloxacin.      "

## 2017-07-07 NOTE — PLAN OF CARE
Problem: Discharge Planning  Goal: Discharge Planning (Adult, OB, Behavioral, Peds)  Outcome: Adequate for Discharge Date Met:  07/07/17  Patient's After Visit Summary was reviewed with patient and daughter/  Patient verbalized understanding of After Visit Summary, recommended follow up and was given an opportunity to ask questions.   Discharge medications sent home with patient/family: YES,    Discharged with spouse and daughter          Patient was stable at discharge with gonzalez cath in place. Patient was educated on gonzalez cath at discharge, and was ambulatory at discharge. Patient's daughter and  walked patient out.     OBSERVATION patient END time: 9892

## 2017-07-07 NOTE — PLAN OF CARE
"Problem: Discharge Planning  Goal: Discharge Planning (Adult, OB, Behavioral, Peds)  Outcome: Improving  Problem: Discharge Planning  Goal: Discharge Planning (Adult, OB, Behavioral, Peds)  PRIMARY DIAGNOSIS: \"GENERIC\" NURSING  OUTPATIENT/OBSERVATION GOALS TO BE MET BEFORE DISCHARGE:  1. ADLs back to baseline: Yes      2. Activity and level of assistance: Up with standby assistance.      3. Pain status: Pain free.      4. Return to near baseline physical activity: Yes      Discharge Planner Nurse   Safe discharge environment identified: Yes  Barriers to discharge: Yes, will need family member to stay with patient at home for 24 hours.       Entered by: Abi Gaffney 07/07/2017 1:48 PM     Please review provider order for any additional goals.   Nurse to notify provider when observation goals have been met and patient is ready for discharge.      Patient A&Ox4, up ambulating with stand by assist. Patient is pain free. Patient is passing gas, last BM today. Urine light pink, with sediment. Anticipated discharge home, family expressing concerns about pt. Being at home alone with catheter care. CBI discontinued and awaiting discharge orders.         "

## 2017-07-07 NOTE — DISCHARGE SUMMARY
[facility] Discharge Summary: Urology    Eliana Lambert MRN# [id number]   Age: 89 year old YOB: 1925     Date of Admission:  7/6/2017  Date of Discharge::  7/7/2017  Admitting Physician:  Artemio Mcclain MD  Discharge Physician:  Trinh Nassar PA-C, HAYDEE           Admission Diagnoses:   Bladder mass          Discharge Diagnosis:   Same          Procedures:   EUA, video cystopanendoscopy,  TURBT (large)          Medications Prior to Admission:     Prescriptions Prior to Admission   Medication Sig Dispense Refill Last Dose     levothyroxine (SYNTHROID, LEVOTHROID) 25 MCG tablet Take 1 tablet (25 mcg) by mouth daily 90 tablet 3 7/6/2017 at Unknown time     aspirin 81 MG chewable tablet Take 325 mg by mouth daily    6/26/2017     B Complex-C (SUPER B COMPLEX PO) Take 1 tablet by mouth daily    6/29/2017     LECITHIN PO Take 1 tablet by mouth daily    6/29/2017     Cranberry 1000 MG CAPS Take 1 capsule by mouth daily    6/29/2017     calcium carbonate (OS-BRADEN 500 MG [place]. [place]) 500 MG tablet Take 500 mg by mouth 2 times daily   6/29/2017     [DISCONTINUED] ciprofloxacin (CIPRO) 500 MG tablet Take 1 tablet (500 mg) by mouth daily Take with evening meal today and evening meal the night before surgery 2 tablet 0 7/4/2017             Discharge Medications:     Current Discharge Medication List      CONTINUE these medications which have CHANGED    Details   ciprofloxacin (CIPRO) 250 MG tablet Take 1 tablet (250 mg) by mouth daily  Qty: 7 tablet, Refills: 0    Associated Diagnoses: Bladder mass         CONTINUE these medications which have NOT CHANGED    Details   levothyroxine (SYNTHROID, LEVOTHROID) 25 MCG tablet Take 1 tablet (25 mcg) by mouth daily  Qty: 90 tablet, Refills: 3    Associated Diagnoses: Other specified hypothyroidism      aspirin 81 MG chewable tablet Take 325 mg by mouth daily       B Complex-C (SUPER B COMPLEX PO) Take 1 tablet by mouth daily       LECITHIN PO Take 1 tablet by  mouth daily       Cranberry 1000 MG CAPS Take 1 capsule by mouth daily       calcium carbonate (OS-BRADEN 500 MG [place]. [place]) 500 MG tablet Take 500 mg by mouth 2 times daily                   Consultations:     None          Hospital Course:     The patient underwent the above procedure and tolerated this well.  Transitioned to oral narcotics on POD 1. Uncomplicated post operative course. Had adequate pain control, ambulating and tolerating regular diet at the time of discharge.  Discharged with Goff until f/u appt.          Discharge Instructions and Follow-Up:   Discharge diet: Regular   Discharge activity: No lifting >20lbs or strenuous exercise   Discharge follow-up: Dr. Sarahi Duncan               Discharge Disposition:   Discharged to home        Trinh Nassar PA-C  [facility]  Cell: [phone] (text or call, Mon-Wed & Fri until 5pm)

## 2017-07-09 LAB
BACTERIA SPEC CULT: ABNORMAL
MICRO REPORT STATUS: ABNORMAL
MICROORGANISM SPEC CULT: ABNORMAL
MICROORGANISM SPEC CULT: ABNORMAL
SPECIMEN SOURCE: ABNORMAL

## 2017-07-10 ENCOUNTER — TELEPHONE (OUTPATIENT)
Dept: INTERNAL MEDICINE | Facility: CLINIC | Age: 82
End: 2017-07-10

## 2017-07-10 NOTE — TELEPHONE ENCOUNTER
"Hospital/TCU/ED for chronic condition Discharge Protocol    \"Hi, my name is Betty Florez, a registered nurse, and I am calling from [facility].  I am calling to follow up and see how things are going for you after your recent emergency visit/hospital/TCU stay.\"    Tell me how you are doing now that you are home?\" I feel fine however catheter is uncomfortable and I am looking forward to getting it removed tomorrow.      Discharge Instructions    \"Let's review your discharge instructions.  What is/are the follow-up recommendations?  Pt. Response: I am going to follow up with Dr. Mcclain tomorrow.    \"Has an appointment with your primary care provider been scheduled?\"   No (schedule appointment)  Patient does not see need to see PCP at this time.    \"When you see the provider, I would recommend that you bring your medications with you.\"    Medications    \"Tell me what changed about your medicines when you discharged?\"    Changes to chronic meds?    0-1    \"What questions do you have about your medications?\"    None     New diagnoses of heart failure, COPD, diabetes, or MI?    No              Medication reconciliation completed? Yes  Was MTM referral placed (*Make sure to put transitions as reason for referral)?   No    Call Summary    \"What questions or concerns do you have about your recent visit and your follow-up care?\"     none    \"If you have questions or things don't continue to improve, we encourage you contact us through the main clinic number (give number).  Even if the clinic is not open, triage nurses are available 24/7 to help you.     We would like you to know that our clinic has extended hours (provide information).  We also have urgent care (provide details on closest location and hours/contact info)\"      \"Thank you for your time and take care!\"           "

## 2017-07-11 ENCOUNTER — OFFICE VISIT (OUTPATIENT)
Dept: UROLOGY | Facility: CLINIC | Age: 82
End: 2017-07-11
Payer: COMMERCIAL

## 2017-07-11 VITALS
SYSTOLIC BLOOD PRESSURE: 108 MMHG | WEIGHT: 150 LBS | DIASTOLIC BLOOD PRESSURE: 72 MMHG | BODY MASS INDEX: 29.45 KG/M2 | HEIGHT: 60 IN

## 2017-07-11 DIAGNOSIS — C67.9 BLADDER CANCER (H): Primary | ICD-10-CM

## 2017-07-11 PROCEDURE — 99213 OFFICE O/P EST LOW 20 MIN: CPT | Performed by: UROLOGY

## 2017-07-11 RX ORDER — CIPROFLOXACIN 500 MG/1
TABLET, FILM COATED ORAL
COMMUNITY
Start: 2017-06-23 | End: 2018-01-01

## 2017-07-11 ASSESSMENT — PAIN SCALES - GENERAL: PAINLEVEL: NO PAIN (0)

## 2017-07-11 NOTE — LETTER
7/11/2017       RE: Eliana Lambert  [address]   [facility] [address]     Dear Colleague,    Thank you for referring your patient, Eliana Lambert, to the [facility] [place] at [facility]. Please see a copy of my visit note below.    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 Her urine is clear and she is here for catheter removal. The pathology report was discussed with her and her daughter. 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 If she decides to pursue BCG therapy, start that in 3 weeks    Again, thank you for allowing me to participate in the care of your patient.      Sincerely,    Artemio Mcclain MD

## 2017-07-11 NOTE — PROGRESS NOTES
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 Her urine is clear and she is here for catheter removal. The pathology report was discussed with her and her daughter. 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 If she decides to pursue BCG therapy, start that in 3 weeks

## 2017-07-11 NOTE — NURSING NOTE
Here to discuss pathology from recent bladder biopsy. Per Dr Sarahi DAVIS today   15 cc water removed from balloon and gonzalez catheter removed with ease..Nydia Marcelo LPN

## 2017-07-11 NOTE — MR AVS SNAPSHOT
"              After Visit Summary   7/11/2017    Eliana Lambert    MRN: [id number]           Patient Information     Date Of Birth          9/13/1925        Visit Information        Provider Department      7/11/2017 11:40 AM Artemio Mcclain MD [facility] [place]         Follow-ups after your visit        Your next 10 appointments already scheduled     Jul 21, 2017  2:30 PM CDT   Post-Op with Artemio Mcclain MD   [facility] ([facility])    [address]  [address]  [place] [id number] [phone]              Who to contact     If you have questions or need follow up information about today's clinic visit or your schedule please contact [facility] directly at [phone].  Normal or non-critical lab and imaging results will be communicated to you by Crowdwavehart, letter or phone within 4 business days after the clinic has received the results. If you do not hear from us within 7 days, please contact the clinic through MyChart or phone. If you have a critical or abnormal lab result, we will notify you by phone as soon as possible.  Submit refill requests through Say2me or call your pharmacy and they will forward the refill request to us. Please allow 3 business days for your refill to be completed.          Additional Information About Your Visit        MyChart Information     Say2me lets you send messages to your doctor, view your test results, renew your prescriptions, schedule appointments and more. To sign up, go to [email or web address] . Click on \"Log in\" on the left side of the screen, which will take you to the Welcome page. Then click on \"Sign up Now\" on the right side of the page.     You will be asked to enter the access code listed below, as well as some personal information. Please follow the directions to create your username and password.     Your " access code is: [email or web address]  Expires: 2017 12:17 PM     Your access code will  in 90 days. If you need help or a new code, please call your [place] clinic or [phone].        Care EveryWhere ID     This is your Care EveryWhere ID. This could be used by other organizations to access your [place] medical records  [id number]        Your Vitals Were     Height BMI (Body Mass Index)                1.524 m (5') 29.29 kg/m2           Blood Pressure from Last 3 Encounters:   17 108/72   17 103/58   17 128/66    Weight from Last 3 Encounters:   17 68 kg (150 lb)   17 67.1 kg (148 lb)   17 68.5 kg (151 lb)              Today, you had the following     No orders found for display       Primary Care Provider Office Phone # Fax #    Lowell Robertson [phone]       [facility] [address] [facility] [address]        Equal Access to Services     CLARENCE ROSALES : Hadii aad ku hadasho Soomaali, waaxda luqadaha, qaybta kaalmada adeegyada, estevan franklin hayjeromen terri jay . So [facility] [phone].    ATENCIÓN: Si habla español, tiene a esquivel disposición servicios gratuitos de asistencia lingüística. [facility] [phone].    We comply with applicable federal civil rights laws and [place] laws. We do not discriminate on the basis of race, color, national origin, age, disability sex, sexual orientation or gender identity.            Thank you!     Thank you for choosing [facility] [place]  for your care. Our goal is always to provide you with excellent care. Hearing back from our patients is one way we can continue to improve our services. Please take a few minutes to complete the written survey that you may receive in the mail after your visit with us. Thank you!             Your Updated Medication List - Protect others around you: Learn how to safely use, store and throw away your medicines at  www.disposemymeds.org.          This list is accurate as of: 7/11/17 11:59 PM.  Always use your most recent med list.                   Brand Name Dispense Instructions for use Diagnosis    aspirin 81 MG chewable tablet      Take 325 mg by mouth daily        calcium carbonate 1250 MG tablet    OS-BRADEN 500 mg [place]. [place]     Take 500 mg by mouth 2 times daily        * ciprofloxacin 500 MG tablet    CIPRO          * ciprofloxacin 250 MG tablet    CIPRO    7 tablet    Take 1 tablet (250 mg) by mouth daily    Bladder mass       Cranberry 1000 MG Caps      Take 1 capsule by mouth daily        LECITHIN PO      Take 1 tablet by mouth daily        levothyroxine 25 MCG tablet    SYNTHROID/LEVOTHROID    90 tablet    Take 1 tablet (25 mcg) by mouth daily    Other specified hypothyroidism       SUPER B COMPLEX PO      Take 1 tablet by mouth daily        * Notice:  This list has 2 medication(s) that are the same as other medications prescribed for you. Read the directions carefully, and ask your doctor or other care provider to review them with you.

## 2017-07-26 ENCOUNTER — OFFICE VISIT (OUTPATIENT)
Dept: UROLOGY | Facility: CLINIC | Age: 82
End: 2017-07-26
Payer: COMMERCIAL

## 2017-07-26 VITALS
HEART RATE: 80 BPM | DIASTOLIC BLOOD PRESSURE: 78 MMHG | WEIGHT: 150 LBS | SYSTOLIC BLOOD PRESSURE: 140 MMHG | HEIGHT: 60 IN | BODY MASS INDEX: 29.45 KG/M2

## 2017-07-26 DIAGNOSIS — C67.2 MALIGNANT NEOPLASM OF LATERAL WALL OF URINARY BLADDER (H): Primary | ICD-10-CM

## 2017-07-26 PROCEDURE — 99211 OFF/OP EST MAY X REQ PHY/QHP: CPT | Performed by: UROLOGY

## 2017-07-26 ASSESSMENT — PAIN SCALES - GENERAL: PAINLEVEL: MILD PAIN (2)

## 2017-07-26 NOTE — PROGRESS NOTES
Eliana Lambert is a 89-year-old female with high-grade invasive bladder cancer that was resected 3 weeks ago. She is doing well postop. 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 I reviewed the pathology report with the patient and her daughter today. I discussed treatment options in follow-up and she wishes have no treatment or follow-up. She has discussed this with her family and they support her decision.  Assessment: High-grade, stage TI TCC bladder  Plan: No further treatment or follow-up. She knows that I'm here for her support and she is welcome to call me or see me if she has any concerns or questions

## 2017-07-26 NOTE — MR AVS SNAPSHOT
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  Normal or non-critical lab and imaging results will be communicated to you by MOOIhart, letter or phone within 4 business days after the clinic has received the results. If you do not hear from us within 7 days, please contact the clinic through MOOIhart or phone. If you have a critical or abnormal lab result, we will notify you by phone as soon as possible.  Submit refill requests through [email or web address] or call your pharmacy and they will forward the refill request to us. Please allow 3 business days for your refill to be completed.          Additional Information About Your Visit        MyChart Information     [email or web address] gives you secure access to your electronic health record. If you see a primary care provider, you can also send messages to your care team and make appointments. If you have questions, please call your primary care clinic.  If you do not have a primary care provider, please call [phone] and they will assist you.        Care EveryWhere ID     This is your Care EveryWhere ID. This could be used by other organizations to access your [place] medical records  [id number]        Your Vitals Were     Pulse Height BMI (Body Mass Index)             80 1.524 m (5') 29.29 kg/m2          Blood Pressure from Last 3 Encounters:   07/26/17 140/78   07/11/17 108/72   07/07/17  103/58    Weight from Last 3 Encounters:   07/26/17 68 kg (150 lb)   07/11/17 68 kg (150 lb)   07/06/17 67.1 kg (148 lb)              Today, you had the following     No orders found for display       Primary Care Provider Office Phone # Fax #    Lowell Robertson [phone]       [facility] [address] [facility] [address]        Equal Access to Services     CLARENCE ROSAELS : Hadii aad ku hadasho Soomaali, waaxda luqadaha, qaybta kaalmada adeegyada, waxay idiin hayaan adeeg kharash la'aan . So [facility] [phone].    ATENCIÓN: Si habla español, tiene a esquivel disposición servicios gratuitos de asistencia lingüística. [facility] [phone].    We comply with applicable federal civil rights laws and [place] laws. We do not discriminate on the basis of race, color, national origin, age, disability sex, sexual orientation or gender identity.            Thank you!     Thank you for choosing [facility] [place]  for your care. Our goal is always to provide you with excellent care. Hearing back from our patients is one way we can continue to improve our services. Please take a few minutes to complete the written survey that you may receive in the mail after your visit with us. Thank you!             Your Updated Medication List - Protect others around you: Learn how to safely use, store and throw away your medicines at www.disposemymeds.org.          This list is accurate as of: 7/26/17 11:20 AM.  Always use your most recent med list.                   Brand Name Dispense Instructions for use Diagnosis    aspirin 81 MG chewable tablet      Take 325 mg by mouth daily        calcium carbonate 1250 MG tablet    OS-BRADEN 500 mg [place]. [place]     Take 500 mg by mouth 2 times daily        ciprofloxacin 500 MG tablet    CIPRO          Cranberry 1000 MG Caps      Take 1 capsule by mouth daily        LECITHIN PO      Take 1 tablet by mouth daily        levothyroxine 25 MCG  tablet    SYNTHROID/LEVOTHROID    90 tablet    Take 1 tablet (25 mcg) by mouth daily    Other specified hypothyroidism       SUPER B COMPLEX PO      Take 1 tablet by mouth daily

## 2017-07-26 NOTE — LETTER
7/26/2017       RE: Eliana Lambert  [address]   [place] [address]     Dear Colleague,    Thank you for referring your patient, Eliana Lambert, to the [facility] [place] at [facility]. Please see a copy of my visit note below.    Eliana Lambert is a 89-year-old female with high-grade invasive bladder cancer that was resected 3 weeks ago. She is doing well postop. 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 I reviewed the pathology report with the patient and her daughter today. I discussed treatment options in follow-up and she wishes have no treatment or follow-up. She has discussed this with her family and they support her decision.  Assessment: High-grade, stage TI TCC bladder  Plan: No further treatment or follow-up. She knows that I'm here for her support and she is welcome to call me or see me if she has any concerns or questions    Again, thank you for allowing me to participate in the care of your patient.      Sincerely,    Artemio Mcclain MD

## 2017-10-04 DIAGNOSIS — E03.8 OTHER SPECIFIED HYPOTHYROIDISM: ICD-10-CM

## 2017-10-04 NOTE — TELEPHONE ENCOUNTER
levothyroxine (SYNTHROID, LEVOTHROID) 25 MCG tablet     Last Written Prescription Date: 10/27/2016  Last Quantity: 90, # refills: 3  Last Office Visit with [facility], [facility] or [facility] prescribing provider: 06/28/2017        TSH   Date Value Ref Range Status   06/28/2017 2.52 0.40 - 4.00 mU/L Final

## 2017-10-05 RX ORDER — LEVOTHYROXINE SODIUM 25 UG/1
25 TABLET ORAL DAILY
Qty: 90 TABLET | Refills: 3 | Status: SHIPPED | OUTPATIENT
Start: 2017-10-05 | End: 2018-01-01

## 2017-10-13 ENCOUNTER — TELEPHONE (OUTPATIENT)
Dept: INFUSION THERAPY | Facility: CLINIC | Age: 82
End: 2017-10-13

## 2017-10-13 NOTE — TELEPHONE ENCOUNTER
Spoke with patient in regards to the treatment summary that was mailed and answered questions about the Survivorship program as needed. Follow up as previously instructed.

## 2017-12-15 ENCOUNTER — DOCUMENTATION ONLY (OUTPATIENT)
Dept: OTHER | Facility: CLINIC | Age: 82
End: 2017-12-15

## 2017-12-15 PROBLEM — Z71.89 ACP (ADVANCE CARE PLANNING): Chronic | Status: ACTIVE | Noted: 2017-12-15

## 2018-01-01 ENCOUNTER — OFFICE VISIT (OUTPATIENT)
Dept: INTERNAL MEDICINE | Facility: CLINIC | Age: 83
End: 2018-01-01
Payer: COMMERCIAL

## 2018-01-01 VITALS
RESPIRATION RATE: 15 BRPM | BODY MASS INDEX: 27.55 KG/M2 | TEMPERATURE: 97.9 F | HEIGHT: 60 IN | HEART RATE: 82 BPM | OXYGEN SATURATION: 96 % | SYSTOLIC BLOOD PRESSURE: 116 MMHG | WEIGHT: 140.3 LBS | DIASTOLIC BLOOD PRESSURE: 70 MMHG

## 2018-01-01 DIAGNOSIS — Z23 NEED FOR PROPHYLACTIC VACCINATION AND INOCULATION AGAINST INFLUENZA: ICD-10-CM

## 2018-01-01 DIAGNOSIS — E03.9 HYPOTHYROIDISM, UNSPECIFIED TYPE: ICD-10-CM

## 2018-01-01 DIAGNOSIS — I48.91 ATRIAL FIBRILLATION, UNSPECIFIED TYPE (H): ICD-10-CM

## 2018-01-01 DIAGNOSIS — Z00.00 MEDICARE ANNUAL WELLNESS VISIT, SUBSEQUENT: Primary | ICD-10-CM

## 2018-01-01 DIAGNOSIS — C67.2 MALIGNANT NEOPLASM OF LATERAL WALL OF URINARY BLADDER (H): ICD-10-CM

## 2018-01-01 DIAGNOSIS — I10 ESSENTIAL HYPERTENSION WITH GOAL BLOOD PRESSURE LESS THAN 140/90: ICD-10-CM

## 2018-01-01 DIAGNOSIS — E78.5 HYPERLIPIDEMIA LDL GOAL <130: ICD-10-CM

## 2018-01-01 LAB
ALBUMIN SERPL-MCNC: 3.4 G/DL (ref 3.4–5)
ALP SERPL-CCNC: 100 U/L (ref 40–150)
ALT SERPL W P-5'-P-CCNC: 23 U/L (ref 0–50)
ANION GAP SERPL CALCULATED.3IONS-SCNC: 7 MMOL/L (ref 3–14)
AST SERPL W P-5'-P-CCNC: 18 U/L (ref 0–45)
BILIRUB SERPL-MCNC: 0.6 MG/DL (ref 0.2–1.3)
BUN SERPL-MCNC: 16 MG/DL (ref 7–30)
CALCIUM SERPL-MCNC: 9.4 MG/DL (ref 8.5–10.1)
CHLORIDE SERPL-SCNC: 104 MMOL/L (ref 94–109)
CO2 SERPL-SCNC: 28 MMOL/L (ref 20–32)
CREAT SERPL-MCNC: 0.92 MG/DL (ref 0.52–1.04)
GFR SERPL CREATININE-BSD FRML MDRD: 57 ML/MIN/1.7M2
GLUCOSE SERPL-MCNC: 85 MG/DL (ref 70–99)
HGB BLD-MCNC: 15.1 G/DL (ref 11.7–15.7)
POTASSIUM SERPL-SCNC: 4.5 MMOL/L (ref 3.4–5.3)
PROT SERPL-MCNC: 7 G/DL (ref 6.8–8.8)
SODIUM SERPL-SCNC: 139 MMOL/L (ref 133–144)
TSH SERPL DL<=0.005 MIU/L-ACNC: 2.98 MU/L (ref 0.4–4)

## 2018-01-01 PROCEDURE — G0008 ADMIN INFLUENZA VIRUS VAC: HCPCS | Performed by: INTERNAL MEDICINE

## 2018-01-01 PROCEDURE — 90662 IIV NO PRSV INCREASED AG IM: CPT | Performed by: INTERNAL MEDICINE

## 2018-01-01 PROCEDURE — 99397 PER PM REEVAL EST PAT 65+ YR: CPT | Mod: 25 | Performed by: INTERNAL MEDICINE

## 2018-01-01 PROCEDURE — 84443 ASSAY THYROID STIM HORMONE: CPT | Performed by: INTERNAL MEDICINE

## 2018-01-01 PROCEDURE — 36415 COLL VENOUS BLD VENIPUNCTURE: CPT | Performed by: INTERNAL MEDICINE

## 2018-01-01 PROCEDURE — 80053 COMPREHEN METABOLIC PANEL: CPT | Performed by: INTERNAL MEDICINE

## 2018-01-01 PROCEDURE — 85018 HEMOGLOBIN: CPT | Performed by: INTERNAL MEDICINE

## 2018-01-01 RX ORDER — LEVOTHYROXINE SODIUM 25 UG/1
25 TABLET ORAL DAILY
Qty: 90 TABLET | Refills: 3 | Status: SHIPPED | OUTPATIENT
Start: 2018-01-01 | End: 2019-01-01

## 2018-05-04 ENCOUNTER — TRANSFERRED RECORDS (OUTPATIENT)
Dept: HEALTH INFORMATION MANAGEMENT | Facility: CLINIC | Age: 83
End: 2018-05-04

## 2018-10-31 NOTE — PROGRESS NOTES

## 2018-10-31 NOTE — LETTER
[facility]  [address] [address]  [phone]      10/31/2018       Eliana Lambert  [address] [place]   [place] [address]        Dear Eliana,    I am pleased to inform you that your routine blood work including your hemoglobin, sodium, potassium, calcium, kidney and liver function tests are all stable.    Your thyroid function tests look good and thus I would not change anything at this point.    Sincerely,      Lowell Robertson MD  Internal Medicine

## 2018-10-31 NOTE — PROGRESS NOTES
"    SUBJECTIVE:   Eliana Lambert is a 89 year old female who presents for Preventive Visit.  Are you in the first 12 months of your Medicare Part B coverage?  No    Patient shows up late for examination appt. but still seen    Physical Health:    In general, how would you rate your overall physical health? good    Outside of work, how many days during the week do you exercise? none    Outside of work, approximately how many minutes a day do you exercise?not applicable    If you drink alcohol do you typically have >3 drinks per day or >7 drinks per week? No    Do you usually eat at least 4 servings of fruit and vegetables a day, include whole grains & fiber and avoid regularly eating high fat or \"junk\" foods? Yes    Do you have any problems taking medications regularly?  No    Do you have any side effects from medications? none    Needs assistance for the following daily activities: transportation    Which of the following safety concerns are present in your home?:  none identified     Hearing impairment: Yes, wears hearing aids     In the past 6 months, have you been bothered by leaking of urine? Yes- wears pads     Mental Health:    In general, how would you rate your overall mental or emotional health? excellent  PHQ-2 Score:      Additional concerns to address?  None    Fall risk:      Fall Risk Assessment not completed.          Reviewed and updated as needed this visit by clinical staff         Reviewed and updated as needed this visit by Provider        Social History   Substance Use Topics     Smoking status: Never Smoker     Smokeless tobacco: Never Used     Alcohol use Yes      Comment: occassional                              Do you feel safe in your environment - Yes    Do you have a Health Care Directive?: Yes: Advance Directive has been received and scanned.    Current providers sharing in care for this patient include:   Patient Care Team:  Lowell Robertson MD as PCP - General (Internal " Medicine)    The following health maintenance items are reviewed in Epic and correct as of today:  Health Maintenance   Topic Date Due     FALL RISK ASSESSMENT  06/28/2018     PHQ-2 Q1 YR  06/28/2018     INFLUENZA VACCINE (1) 09/01/2018     TETANUS IMMUNIZATION (SYSTEM ASSIGNED)  01/01/2019     ADVANCE DIRECTIVE PLANNING Q5 YRS  12/15/2022     PNEUMOCOCCAL  Completed         Immunization History   Administered Date(s) Administered     Influenza (High Dose) 3 valent vaccine 10/20/2014, 10/26/2015     Influenza Vaccine IM 3yrs+ 4 Valent IIV4 10/27/2016     Pneumo Conj 13-V (2010&after) 10/26/2015     Pneumococcal 23 valent 01/01/2009     Tdap (Adacel,Boostrix) 01/01/2009       ROS:  CONSTITUTIONAL: NEGATIVE for fever, chills, change in weight  INTEGUMENTARY/SKIN: NEGATIVE for worrisome rashes, moles or lesions  EYES: NEGATIVE for vision changes or irritation  ENT/MOUTH: NEGATIVE for ear, mouth and throat problems  RESP: NEGATIVE for significant cough or SOB  CV: NEGATIVE for chest pain, palpitations or peripheral edema  GI: NEGATIVE for nausea, abdominal pain, heartburn, or change in bowel habits  : NEGATIVE for frequency, dysuria, or hematuria  MUSCULOSKELETAL: NEGATIVE for significant arthralgias or myalgia  NEURO: NEGATIVE for weakness, dizziness or paresthesias  HEME: NEGATIVE for bleeding problems  PSYCHIATRIC: NEGATIVE for changes in mood or affect    OBJECTIVE:   /70  Pulse 82  Temp 97.9  F (36.6  C) (Oral)  Resp 15  Ht 5' (1.524 m)  Wt 140 lb 4.8 oz (63.6 kg)  SpO2 96%  BMI 27.4 kg/m2 Estimated body mass index is 29.29 kg/(m^2) as calculated from the following:    Height as of 7/26/17: 5' (1.524 m).    Weight as of 7/26/17: 150 lb (68 kg).  EXAM:   GENERAL: healthy, alert and no distress  EYES: Eyes grossly normal to inspection, PERRL and conjunctivae and sclerae normal  HENT: mild [place], ear canals and TM's normal, nose and mouth without ulcers or lesions  NECK: no adenopathy, no asymmetry,  masses, or scars and thyroid normal to palpation  RESP: lungs clear to auscultation - no rales, rhonchi or wheezes  CV: irregular rate and rhythm, normal S1 S2,  no click or rub, no peripheral edema and peripheral pulses strong  ABDOMEN: soft, nontender, no hepatosplenomegaly, no masses and bowel sounds normal  MS: no gross musculoskeletal defects noted  NEURO: Normal strength and tone, mentation intact and speech normal  PSYCH: mentation appears normal, affect normal/bright      ASSESSMENT / PLAN:   1. Medicare annual wellness visit, subsequent  Advised updated ADT  - Comprehensive metabolic panel  - Hemoglobin    She was also advised that it looks like she is due for a tetanus booster in January of this upcoming year and will get this in [place] where she is traveling for 6 months.  She was also advised that due to the fact that she is going to be down there for quite some time that she may benefit from establishing herself a new primary care physician as to be seen if needed.    2. Essential hypertension with goal blood pressure less than 140/90  Stable as ordered  - Comprehensive metabolic panel    3. 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    4. Atrial fibrillation, unspecified type (H)  Rate controlled on therapy, continue with ASA daily as patient informs me that she was offered a different anticoagulant but has refused due to cost    5. Hyperlipidemia LDL goal <130  We will not recheck lipid panel as we would not recommend initiation of therapy based on a    6. Hypothyroidism, unspecified type  Stable on current therapy continue with medical management and continue with medications as ordered  - TSH with free T4 reflex  - levothyroxine (SYNTHROID/LEVOTHROID) 25 MCG tablet; Take 1 tablet (25 mcg)  by mouth daily  Dispense: 90 tablet; Refill: 3    End of Life Planning:  Patient currently has an advanced directive: Yes.  Practitioner is supportive of decision.    COUNSELING:  Reviewed preventive health counseling, as reflected in patient instructions       Regular exercise       Healthy diet/nutrition    BP Readings from Last 1 Encounters:   07/26/17 140/78     Estimated body mass index is 29.29 kg/(m^2) as calculated from the following:    Height as of 7/26/17: 5' (1.524 m).    Weight as of 7/26/17: 150 lb (68 kg).       reports that she has never smoked. She has never used smokeless tobacco.      Appropriate preventive services were discussed with this patient, including applicable screening as appropriate for cardiovascular disease, diabetes, osteopenia/osteoporosis, and glaucoma.  As appropriate for age/gender, discussed screening for colorectal cancer, prostate cancer, breast cancer, and cervical cancer. Checklist reviewing preventive services available has been given to the patient.    Reviewed patients plan of care and provided an AVS. The Basic Care Plan (routine screening as documented in Health Maintenance) for Eliana meets the Care Plan requirement. This Care Plan has been established and reviewed with the Patient.    Counseling Resources:  ATP IV Guidelines  Pooled Cohorts Equation Calculator  Breast Cancer Risk Calculator  FRAX Risk Assessment  ICSI Preventive Guidelines  Dietary Guidelines for Americans, 2010  USDA's MyPlate  ASA Prophylaxis  Lung CA Screening    Lowell Robertson MD  [facility]

## 2018-10-31 NOTE — PATIENT INSTRUCTIONS

## 2018-10-31 NOTE — MR AVS SNAPSHOT
After Visit Summary   10/31/2018    Eliana Lambert    MRN: [id number]           Patient Information     Date Of Birth          9/13/1925        Visit Information        Provider Department      10/31/2018 9:20 AM Lowell Robertson MD 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     Discuss preventive care.      Review your medicines if your doctor has prescribed any.      You no longer need a yearly Pap test unless you've had an abnormal Pap test in the past 10 years. If you have vaginal symptoms, such as bleeding or discharge, be sure to talk with your provider about a Pap test.      Every 1 to 2 years, have a mammogram.  If you are over 69, talk with your health care provider about whether or not you want to continue having screening mammograms.      Every 10 years, have a colonoscopy. Or, have a yearly FIT test (stool test). These exams will check for colon cancer.       Have a cholesterol test every 5 years, or more often if your doctor advises it.       Have a diabetes test (fasting glucose) every three years. If you are at risk for diabetes, you should have this test more often.       At age 65, have a bone density scan (DEXA) to check for osteoporosis (brittle bone disease).    Shots:    Get a flu shot each year.    Get a tetanus shot every 10 years.    Talk to your doctor about your pneumonia vaccines. There are now two you should receive - Pneumovax (PPSV  23) and Prevnar (PCV 13).    Talk to your pharmacist about the shingles vaccine.    Talk to your doctor about the hepatitis B vaccine.    Nutrition:     Eat at least 5 servings of fruits and vegetables each day.      Eat whole-grain bread, whole-wheat pasta and brown rice instead of white grains and rice.      Get adequate Calcium and Vitamin D.     Lifestyle    Exercise at least 150 minutes a week (30 minutes a day, 5 days a week). This will help you control your weight and prevent disease.      Limit alcohol to one drink per day.      No smoking.       Wear sunscreen to prevent skin cancer.       See your dentist twice a year for an exam and cleaning.      See your eye doctor every 1 to 2 years to screen for conditions such as glaucoma, macular degeneration and cataracts.    Personalized Prevention Plan  You are due for the preventive services outlined below.  Your care team is available to assist you in scheduling these services.  If you have already completed any of these items, please share that information with your care team to update in your medical record.  Health Maintenance Due   Topic Date Due     FALL RISK ASSESSMENT  06/28/2018     Depression Assessment 2 - yearly  06/28/2018     Flu Vaccine (1) 09/01/2018             Follow-ups after your visit        Follow-up notes from your care team     Return if symptoms worsen or fail to improve, for 6 month follow-up clinic visit.      Who to contact     If you have questions or need follow up information about today's clinic visit or your schedule please contact [facility] directly at [phone].  Normal or non-critical lab and imaging results will be communicated to you by MyChart, letter or phone within 4 business days after the clinic has received the results. If you do not hear from us within 7 days, please contact the clinic through MyChart or phone. If you have a critical or abnormal lab result, we will notify you by phone as  soon as possible.  Submit refill requests through [employer] or call your pharmacy and they will forward the refill request to us. Please allow 3 business days for your refill to be completed.          Additional Information About Your Visit        [employer] Information     [employer] gives you secure access to your electronic health record. If you see a primary care provider, you can also send messages to your care team and make appointments. If you have questions, please call your primary care clinic.  If you do not have a primary care provider, please call [phone] and they will assist you.        Care EveryWhere ID     This is your Care EveryWhere ID. This could be used by other organizations to access your [place] medical records  [id number]        Your Vitals Were     Pulse Temperature Respirations Height Pulse Oximetry BMI (Body Mass Index)    82 97.9  F (36.6  C) (Oral) 15 5' (1.524 m) 96% 27.4 kg/m2       Blood Pressure from Last 3 Encounters:   10/31/18 116/70   07/26/17 140/78   07/11/17 108/72    Weight from Last 3 Encounters:   10/31/18 140 lb 4.8 oz (63.6 kg)   07/26/17 150 lb (68 kg)   07/11/17 150 lb (68 kg)              We Performed the Following     ADMIN INFLUENZA (For MEDICARE Patients ONLY) []     Comprehensive metabolic panel     FLU VACCINE, INCREASED ANTIGEN, PRESV FREE, AGE 65+ [05292]     Hemoglobin     TSH with free T4 reflex          Where to get your medicines      These medications were sent to [facility] - [facility] [address]  [address], [facility] [address]     Phone:  [phone]     levothyroxine 25 MCG tablet          Primary Care Provider Office Phone # Fax #    Lowell Robertson [phone]       [address] [place] [id number]        Equal Access to Services     CLARENCE BAH: Billie Donald, waaxda luqadaha, qaybta kaalmarodrigo wright, estevan bah. So [facility]  [phone].    ATENCIÓN: Si edith sherman, tiene a esquivel disposición servicios gratuitos de asistencia lingüística. Rashad bernardo [phone].    We comply with applicable federal civil rights laws and [place] laws. We do not discriminate on the basis of race, color, national origin, age, disability, sex, sexual orientation, or gender identity.            Thank you!     Thank you for choosing [facility]  for your care. Our goal is always to provide you with excellent care. Hearing back from our patients is one way we can continue to improve our services. Please take a few minutes to complete the written survey that you may receive in the mail after your visit with us. Thank you!             Your Updated Medication List - Protect others around you: Learn how to safely use, store and throw away your medicines at www.disposemymeds.org.          This list is accurate as of 10/31/18  9:59 AM.  Always use your most recent med list.                   Brand Name Dispense Instructions for use Diagnosis    aspirin 81 MG chewable tablet      Take 325 mg by mouth daily        calcium carbonate 500 mg (elemental) 500 MG tablet    OS-BRADEN     Take 500 mg by mouth 2 times daily        Cranberry 1000 MG Caps      Take 1 capsule by mouth daily        LECITHIN PO      Take 1 tablet by mouth daily        levothyroxine 25 MCG tablet    SYNTHROID/LEVOTHROID    90 tablet    Take 1 tablet (25 mcg) by mouth daily    Hypothyroidism, unspecified type       SUPER B COMPLEX PO      Take 1 tablet by mouth daily

## 2019-01-01 ENCOUNTER — ANCILLARY PROCEDURE (OUTPATIENT)
Dept: GENERAL RADIOLOGY | Facility: CLINIC | Age: 84
End: 2019-01-01
Attending: INTERNAL MEDICINE
Payer: COMMERCIAL

## 2019-01-01 ENCOUNTER — OFFICE VISIT (OUTPATIENT)
Dept: INTERNAL MEDICINE | Facility: CLINIC | Age: 84
End: 2019-01-01
Payer: COMMERCIAL

## 2019-01-01 ENCOUNTER — TELEPHONE (OUTPATIENT)
Dept: INTERNAL MEDICINE | Facility: CLINIC | Age: 84
End: 2019-01-01

## 2019-01-01 ENCOUNTER — HOSPITAL ENCOUNTER (OUTPATIENT)
Dept: CT IMAGING | Facility: CLINIC | Age: 84
Discharge: HOME | DRG: 299 | End: 2019-10-03
Attending: INTERNAL MEDICINE | Admitting: INTERNAL MEDICINE
Payer: COMMERCIAL

## 2019-01-01 ENCOUNTER — HOSPITAL ENCOUNTER (INPATIENT)
Facility: CLINIC | Age: 84
LOS: 3 days | Discharge: HOME | DRG: 176 | End: 2019-09-17
Attending: EMERGENCY MEDICINE | Admitting: STUDENT IN AN ORGANIZED HEALTH CARE EDUCATION/TRAINING PROGRAM
Payer: COMMERCIAL

## 2019-01-01 ENCOUNTER — APPOINTMENT (OUTPATIENT)
Dept: CT IMAGING | Facility: CLINIC | Age: 84
DRG: 299 | End: 2019-01-01
Attending: EMERGENCY MEDICINE
Payer: COMMERCIAL

## 2019-01-01 ENCOUNTER — HOSPITAL ENCOUNTER (INPATIENT)
Facility: CLINIC | Age: 84
LOS: 4 days | Discharge: HOSPICE | DRG: 299 | End: 2019-10-07
Attending: EMERGENCY MEDICINE | Admitting: INTERNAL MEDICINE
Payer: COMMERCIAL

## 2019-01-01 ENCOUNTER — APPOINTMENT (OUTPATIENT)
Dept: PHYSICAL THERAPY | Facility: CLINIC | Age: 84
DRG: 176 | End: 2019-01-01
Attending: HOSPITALIST
Payer: COMMERCIAL

## 2019-01-01 ENCOUNTER — APPOINTMENT (OUTPATIENT)
Dept: CT IMAGING | Facility: CLINIC | Age: 84
DRG: 176 | End: 2019-01-01
Attending: EMERGENCY MEDICINE
Payer: COMMERCIAL

## 2019-01-01 ENCOUNTER — TRANSFERRED RECORDS (OUTPATIENT)
Dept: HEALTH INFORMATION MANAGEMENT | Facility: CLINIC | Age: 84
End: 2019-01-01

## 2019-01-01 VITALS
SYSTOLIC BLOOD PRESSURE: 128 MMHG | BODY MASS INDEX: 25.11 KG/M2 | TEMPERATURE: 97.4 F | DIASTOLIC BLOOD PRESSURE: 72 MMHG | OXYGEN SATURATION: 96 % | HEART RATE: 98 BPM | WEIGHT: 133 LBS | HEIGHT: 61 IN

## 2019-01-01 VITALS
SYSTOLIC BLOOD PRESSURE: 130 MMHG | HEART RATE: 85 BPM | RESPIRATION RATE: 16 BRPM | OXYGEN SATURATION: 98 % | WEIGHT: 133 LBS | TEMPERATURE: 97.6 F | DIASTOLIC BLOOD PRESSURE: 68 MMHG | BODY MASS INDEX: 25.13 KG/M2

## 2019-01-01 VITALS
BODY MASS INDEX: 26.23 KG/M2 | HEART RATE: 87 BPM | WEIGHT: 134.3 LBS | TEMPERATURE: 97.3 F | RESPIRATION RATE: 17 BRPM | OXYGEN SATURATION: 98 % | SYSTOLIC BLOOD PRESSURE: 108 MMHG | DIASTOLIC BLOOD PRESSURE: 62 MMHG

## 2019-01-01 VITALS
DIASTOLIC BLOOD PRESSURE: 52 MMHG | TEMPERATURE: 97.9 F | HEART RATE: 82 BPM | RESPIRATION RATE: 18 BRPM | WEIGHT: 132.3 LBS | BODY MASS INDEX: 24.98 KG/M2 | SYSTOLIC BLOOD PRESSURE: 113 MMHG | HEIGHT: 61 IN | OXYGEN SATURATION: 95 %

## 2019-01-01 VITALS
RESPIRATION RATE: 14 BRPM | HEART RATE: 94 BPM | BODY MASS INDEX: 27.29 KG/M2 | OXYGEN SATURATION: 99 % | HEIGHT: 60 IN | WEIGHT: 139 LBS | SYSTOLIC BLOOD PRESSURE: 124 MMHG | DIASTOLIC BLOOD PRESSURE: 68 MMHG | TEMPERATURE: 97.5 F

## 2019-01-01 VITALS
HEART RATE: 85 BPM | OXYGEN SATURATION: 91 % | SYSTOLIC BLOOD PRESSURE: 111 MMHG | HEIGHT: 61 IN | RESPIRATION RATE: 18 BRPM | BODY MASS INDEX: 25.11 KG/M2 | WEIGHT: 133 LBS | DIASTOLIC BLOOD PRESSURE: 52 MMHG | TEMPERATURE: 97.6 F

## 2019-01-01 DIAGNOSIS — I10 ESSENTIAL HYPERTENSION WITH GOAL BLOOD PRESSURE LESS THAN 140/90: ICD-10-CM

## 2019-01-01 DIAGNOSIS — I99.8 ISCHEMIA OF TOE: ICD-10-CM

## 2019-01-01 DIAGNOSIS — R06.00 DYSPNEA, UNSPECIFIED TYPE: ICD-10-CM

## 2019-01-01 DIAGNOSIS — I26.99 ACUTE PULMONARY EMBOLISM WITHOUT ACUTE COR PULMONALE, UNSPECIFIED PULMONARY EMBOLISM TYPE (H): ICD-10-CM

## 2019-01-01 DIAGNOSIS — E03.9 HYPOTHYROIDISM, UNSPECIFIED TYPE: ICD-10-CM

## 2019-01-01 DIAGNOSIS — R30.0 DYSURIA: Primary | ICD-10-CM

## 2019-01-01 DIAGNOSIS — Z23 NEED FOR PROPHYLACTIC VACCINATION AND INOCULATION AGAINST INFLUENZA: ICD-10-CM

## 2019-01-01 DIAGNOSIS — I26.99 OTHER ACUTE PULMONARY EMBOLISM WITHOUT ACUTE COR PULMONALE (H): ICD-10-CM

## 2019-01-01 DIAGNOSIS — I70.90 ARTERIAL OCCLUSION: ICD-10-CM

## 2019-01-01 DIAGNOSIS — I99.8 LOWER LIMB ISCHEMIA: Primary | ICD-10-CM

## 2019-01-01 DIAGNOSIS — Z09 HOSPITAL DISCHARGE FOLLOW-UP: Primary | ICD-10-CM

## 2019-01-01 DIAGNOSIS — I48.91 ATRIAL FIBRILLATION, UNSPECIFIED TYPE (H): ICD-10-CM

## 2019-01-01 DIAGNOSIS — I26.99 OTHER PULMONARY EMBOLISM WITHOUT ACUTE COR PULMONALE, UNSPECIFIED CHRONICITY (H): ICD-10-CM

## 2019-01-01 DIAGNOSIS — C67.2 MALIGNANT NEOPLASM OF LATERAL WALL OF URINARY BLADDER (H): Primary | ICD-10-CM

## 2019-01-01 DIAGNOSIS — R06.00 DYSPNEA, UNSPECIFIED TYPE: Primary | ICD-10-CM

## 2019-01-01 DIAGNOSIS — I75.021 BLUE TOE SYNDROME OF RIGHT LOWER EXTREMITY (H): ICD-10-CM

## 2019-01-01 DIAGNOSIS — C67.2 MALIGNANT NEOPLASM OF LATERAL WALL OF URINARY BLADDER (H): ICD-10-CM

## 2019-01-01 DIAGNOSIS — L81.9 DISCOLORATION OF SKIN OF TOE: ICD-10-CM
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
## 2019-01-01 DIAGNOSIS — I70.229 CRITICAL LOWER LIMB ISCHEMIA (H): Primary | ICD-10-CM

## 2019-01-01 LAB
ALBUMIN SERPL-MCNC: 3 G/DL (ref 3.4–5)
ALBUMIN SERPL-MCNC: 3.1 G/DL (ref 3.4–5)
ALBUMIN UR-MCNC: 10 MG/DL
ALBUMIN UR-MCNC: NEGATIVE MG/DL
ALP SERPL-CCNC: 139 U/L (ref 40–150)
ALP SERPL-CCNC: 161 U/L (ref 40–150)
ALT SERPL W P-5'-P-CCNC: 20 U/L (ref 0–50)
ALT SERPL W P-5'-P-CCNC: 24 U/L (ref 0–50)
ANION GAP SERPL CALCULATED.3IONS-SCNC: 5 MMOL/L (ref 3–14)
ANION GAP SERPL CALCULATED.3IONS-SCNC: 5 MMOL/L (ref 3–14)
ANION GAP SERPL CALCULATED.3IONS-SCNC: 6 MMOL/L (ref 3–14)
ANION GAP SERPL CALCULATED.3IONS-SCNC: 7 MMOL/L (ref 3–14)
ANION GAP SERPL CALCULATED.3IONS-SCNC: 7 MMOL/L (ref 3–14)
APPEARANCE UR: CLEAR
APPEARANCE UR: CLEAR
APTT PPP: 159 SEC (ref 22–37)
APTT PPP: 67 SEC (ref 22–37)
AST SERPL W P-5'-P-CCNC: 28 U/L (ref 0–45)
AST SERPL W P-5'-P-CCNC: 29 U/L (ref 0–45)
BACTERIA #/AREA URNS HPF: ABNORMAL /HPF
BACTERIA SPEC CULT: NORMAL
BACTERIA SPEC CULT: NORMAL
BASOPHILS # BLD AUTO: 0 10E9/L (ref 0–0.2)
BASOPHILS # BLD AUTO: 0.1 10E9/L (ref 0–0.2)
BASOPHILS NFR BLD AUTO: 0.3 %
BASOPHILS NFR BLD AUTO: 0.4 %
BILIRUB DIRECT SERPL-MCNC: 0.2 MG/DL (ref 0–0.2)
BILIRUB SERPL-MCNC: 0.6 MG/DL (ref 0.2–1.3)
BILIRUB SERPL-MCNC: 0.6 MG/DL (ref 0.2–1.3)
BILIRUB UR QL STRIP: NEGATIVE
BILIRUB UR QL STRIP: NEGATIVE
BUN SERPL-MCNC: 11 MG/DL (ref 7–30)
BUN SERPL-MCNC: 11 MG/DL (ref 7–30)
BUN SERPL-MCNC: 14 MG/DL (ref 7–30)
BUN SERPL-MCNC: 14 MG/DL (ref 7–30)
BUN SERPL-MCNC: 15 MG/DL (ref 7–30)
BUN SERPL-MCNC: 17 MG/DL (ref 7–30)
BUN SERPL-MCNC: 19 MG/DL (ref 7–30)
CALCIUM SERPL-MCNC: 8.3 MG/DL (ref 8.5–10.1)
CALCIUM SERPL-MCNC: 8.4 MG/DL (ref 8.5–10.1)
CALCIUM SERPL-MCNC: 8.5 MG/DL (ref 8.5–10.1)
CALCIUM SERPL-MCNC: 9.1 MG/DL (ref 8.5–10.1)
CALCIUM SERPL-MCNC: 9.3 MG/DL (ref 8.5–10.1)
CALCIUM SERPL-MCNC: 9.4 MG/DL (ref 8.5–10.1)
CALCIUM SERPL-MCNC: 9.7 MG/DL (ref 8.5–10.1)
CHLORIDE SERPL-SCNC: 101 MMOL/L (ref 94–109)
CHLORIDE SERPL-SCNC: 101 MMOL/L (ref 94–109)
CHLORIDE SERPL-SCNC: 102 MMOL/L (ref 94–109)
CHLORIDE SERPL-SCNC: 103 MMOL/L (ref 94–109)
CHLORIDE SERPL-SCNC: 105 MMOL/L (ref 94–109)
CHLORIDE SERPL-SCNC: 106 MMOL/L (ref 94–109)
CHLORIDE SERPL-SCNC: 108 MMOL/L (ref 94–109)
CO2 SERPL-SCNC: 23 MMOL/L (ref 20–32)
CO2 SERPL-SCNC: 25 MMOL/L (ref 20–32)
CO2 SERPL-SCNC: 26 MMOL/L (ref 20–32)
CO2 SERPL-SCNC: 27 MMOL/L (ref 20–32)
CO2 SERPL-SCNC: 27 MMOL/L (ref 20–32)
COLOR UR AUTO: YELLOW
COLOR UR AUTO: YELLOW
CREAT SERPL-MCNC: 0.56 MG/DL (ref 0.52–1.04)
CREAT SERPL-MCNC: 0.61 MG/DL (ref 0.52–1.04)
CREAT SERPL-MCNC: 0.68 MG/DL (ref 0.52–1.04)
CREAT SERPL-MCNC: 0.68 MG/DL (ref 0.52–1.04)
CREAT SERPL-MCNC: 0.7 MG/DL (ref 0.52–1.04)
CREAT SERPL-MCNC: 0.72 MG/DL (ref 0.52–1.04)
CREAT SERPL-MCNC: 0.78 MG/DL (ref 0.52–1.04)
DIFFERENTIAL METHOD BLD: ABNORMAL
DIFFERENTIAL METHOD BLD: ABNORMAL
EOSINOPHIL # BLD AUTO: 0.3 10E9/L (ref 0–0.7)
EOSINOPHIL # BLD AUTO: 0.4 10E9/L (ref 0–0.7)
EOSINOPHIL NFR BLD AUTO: 2.3 %
EOSINOPHIL NFR BLD AUTO: 2.5 %
ERYTHROCYTE [DISTWIDTH] IN BLOOD BY AUTOMATED COUNT: 13.7 % (ref 10–15)
ERYTHROCYTE [DISTWIDTH] IN BLOOD BY AUTOMATED COUNT: 13.7 % (ref 10–15)
ERYTHROCYTE [DISTWIDTH] IN BLOOD BY AUTOMATED COUNT: 14.2 % (ref 10–15)
ERYTHROCYTE [DISTWIDTH] IN BLOOD BY AUTOMATED COUNT: 14.3 % (ref 10–15)
ERYTHROCYTE [DISTWIDTH] IN BLOOD BY AUTOMATED COUNT: 14.3 % (ref 10–15)
GFR SERPL CREATININE-BSD FRML MDRD: 65 ML/MIN/{1.73_M2}
GFR SERPL CREATININE-BSD FRML MDRD: 72 ML/MIN/{1.73_M2}
GFR SERPL CREATININE-BSD FRML MDRD: 74 ML/MIN/{1.73_M2}
GFR SERPL CREATININE-BSD FRML MDRD: 75 ML/MIN/{1.73_M2}
GFR SERPL CREATININE-BSD FRML MDRD: 75 ML/MIN/{1.73_M2}
GFR SERPL CREATININE-BSD FRML MDRD: 78 ML/MIN/{1.73_M2}
GFR SERPL CREATININE-BSD FRML MDRD: 80 ML/MIN/{1.73_M2}
GLUCOSE SERPL-MCNC: 106 MG/DL (ref 70–99)
GLUCOSE SERPL-MCNC: 131 MG/DL (ref 70–99)
GLUCOSE SERPL-MCNC: 80 MG/DL (ref 70–99)
GLUCOSE SERPL-MCNC: 92 MG/DL (ref 70–99)
GLUCOSE SERPL-MCNC: 93 MG/DL (ref 70–99)
GLUCOSE SERPL-MCNC: 94 MG/DL (ref 70–99)
GLUCOSE SERPL-MCNC: 94 MG/DL (ref 70–99)
GLUCOSE UR STRIP-MCNC: NEGATIVE MG/DL
GLUCOSE UR STRIP-MCNC: NEGATIVE MG/DL
HCT VFR BLD AUTO: 35.5 % (ref 35–47)
HCT VFR BLD AUTO: 37.6 % (ref 35–47)
HCT VFR BLD AUTO: 38.7 % (ref 35–47)
HCT VFR BLD AUTO: 39.4 % (ref 35–47)
HCT VFR BLD AUTO: 42.1 % (ref 35–47)
HGB BLD-MCNC: 11.9 G/DL (ref 11.7–15.7)
HGB BLD-MCNC: 12.3 G/DL (ref 11.7–15.7)
HGB BLD-MCNC: 12.6 G/DL (ref 11.7–15.7)
HGB BLD-MCNC: 12.7 G/DL (ref 11.7–15.7)
HGB BLD-MCNC: 12.7 G/DL (ref 11.7–15.7)
HGB BLD-MCNC: 13.5 G/DL (ref 11.7–15.7)
HGB BLD-MCNC: 13.9 G/DL (ref 11.7–15.7)
HGB UR QL STRIP: ABNORMAL
HGB UR QL STRIP: NEGATIVE
IMM GRANULOCYTES # BLD: 0 10E9/L (ref 0–0.4)
IMM GRANULOCYTES # BLD: 0.1 10E9/L (ref 0–0.4)
IMM GRANULOCYTES NFR BLD: 0.3 %
IMM GRANULOCYTES NFR BLD: 0.3 %
INR PPP: 1.53 (ref 0.86–1.14)
INTERPRETATION ECG - MUSE: NORMAL
KETONES UR STRIP-MCNC: NEGATIVE MG/DL
KETONES UR STRIP-MCNC: NEGATIVE MG/DL
LACTATE BLD-SCNC: 1.1 MMOL/L (ref 0.7–2)
LEUKOCYTE ESTERASE UR QL STRIP: ABNORMAL
LEUKOCYTE ESTERASE UR QL STRIP: NEGATIVE
LIPASE SERPL-CCNC: 73 U/L (ref 73–393)
LMWH PPP CHRO-ACNC: 0.32 IU/ML
LMWH PPP CHRO-ACNC: 0.54 IU/ML
LMWH PPP CHRO-ACNC: 0.59 IU/ML
LMWH PPP CHRO-ACNC: 1.61 IU/ML
LMWH PPP CHRO-ACNC: >2 IU/ML
LYMPHOCYTES # BLD AUTO: 1.5 10E9/L (ref 0.8–5.3)
LYMPHOCYTES # BLD AUTO: 1.9 10E9/L (ref 0.8–5.3)
LYMPHOCYTES NFR BLD AUTO: 11.9 %
LYMPHOCYTES NFR BLD AUTO: 12.5 %
Lab: NORMAL
MCH RBC QN AUTO: 29.8 PG (ref 26.5–33)
MCH RBC QN AUTO: 30.2 PG (ref 26.5–33)
MCH RBC QN AUTO: 30.4 PG (ref 26.5–33)
MCH RBC QN AUTO: 30.7 PG (ref 26.5–33)
MCH RBC QN AUTO: 30.7 PG (ref 26.5–33)
MCHC RBC AUTO-ENTMCNC: 32.2 G/DL (ref 31.5–36.5)
MCHC RBC AUTO-ENTMCNC: 32.8 G/DL (ref 31.5–36.5)
MCHC RBC AUTO-ENTMCNC: 33 G/DL (ref 31.5–36.5)
MCHC RBC AUTO-ENTMCNC: 33.5 G/DL (ref 31.5–36.5)
MCHC RBC AUTO-ENTMCNC: 33.5 G/DL (ref 31.5–36.5)
MCV RBC AUTO: 91 FL (ref 78–100)
MCV RBC AUTO: 92 FL (ref 78–100)
MCV RBC AUTO: 92 FL (ref 78–100)
MCV RBC AUTO: 93 FL (ref 78–100)
MCV RBC AUTO: 93 FL (ref 78–100)
MONOCYTES # BLD AUTO: 1 10E9/L (ref 0–1.3)
MONOCYTES # BLD AUTO: 1.3 10E9/L (ref 0–1.3)
MONOCYTES NFR BLD AUTO: 8.2 %
MONOCYTES NFR BLD AUTO: 8.3 %
MUCOUS THREADS #/AREA URNS LPF: PRESENT /LPF
NEUTROPHILS # BLD AUTO: 11.8 10E9/L (ref 1.6–8.3)
NEUTROPHILS # BLD AUTO: 9.3 10E9/L (ref 1.6–8.3)
NEUTROPHILS NFR BLD AUTO: 76.4 %
NEUTROPHILS NFR BLD AUTO: 76.6 %
NITRATE UR QL: NEGATIVE
NITRATE UR QL: NEGATIVE
NON-SQ EPI CELLS #/AREA URNS LPF: ABNORMAL /LPF
NRBC # BLD AUTO: 0 10*3/UL
NRBC # BLD AUTO: 0 10*3/UL
NRBC BLD AUTO-RTO: 0 /100
NRBC BLD AUTO-RTO: 0 /100
PH UR STRIP: 5.5 PH (ref 5–7)
PH UR STRIP: 6.5 PH (ref 5–7)
PLATELET # BLD AUTO: 224 10E9/L (ref 150–450)
PLATELET # BLD AUTO: 237 10E9/L (ref 150–450)
PLATELET # BLD AUTO: 303 10E9/L (ref 150–450)
PLATELET # BLD AUTO: 305 10E9/L (ref 150–450)
PLATELET # BLD AUTO: 328 10E9/L (ref 150–450)
POTASSIUM SERPL-SCNC: 3.8 MMOL/L (ref 3.4–5.3)
POTASSIUM SERPL-SCNC: 4 MMOL/L (ref 3.4–5.3)
POTASSIUM SERPL-SCNC: 4.1 MMOL/L (ref 3.4–5.3)
POTASSIUM SERPL-SCNC: 4.1 MMOL/L (ref 3.4–5.3)
PROT SERPL-MCNC: 7.3 G/DL (ref 6.8–8.8)
PROT SERPL-MCNC: 7.4 G/DL (ref 6.8–8.8)
RADIOLOGIST FLAGS: ABNORMAL
RBC # BLD AUTO: 3.92 10E12/L (ref 3.8–5.2)
RBC # BLD AUTO: 4.1 10E12/L (ref 3.8–5.2)
RBC # BLD AUTO: 4.21 10E12/L (ref 3.8–5.2)
RBC # BLD AUTO: 4.26 10E12/L (ref 3.8–5.2)
RBC # BLD AUTO: 4.53 10E12/L (ref 3.8–5.2)
RBC #/AREA URNS AUTO: 5 /HPF (ref 0–2)
RBC #/AREA URNS AUTO: ABNORMAL /HPF
SODIUM SERPL-SCNC: 133 MMOL/L (ref 133–144)
SODIUM SERPL-SCNC: 134 MMOL/L (ref 133–144)
SODIUM SERPL-SCNC: 136 MMOL/L (ref 133–144)
SODIUM SERPL-SCNC: 137 MMOL/L (ref 133–144)
SOURCE: ABNORMAL
SOURCE: ABNORMAL
SP GR UR STRIP: 1 (ref 1–1.03)
SP GR UR STRIP: 1.01 (ref 1–1.03)
SPECIMEN SOURCE: NORMAL
SPECIMEN SOURCE: NORMAL
SQUAMOUS #/AREA URNS AUTO: 3 /HPF (ref 0–1)
TROPONIN I SERPL-MCNC: <0.015 UG/L (ref 0–0.04)
UROBILINOGEN UR STRIP-ACNC: 0.2 EU/DL (ref 0.2–1)
UROBILINOGEN UR STRIP-MCNC: NORMAL MG/DL (ref 0–2)
WBC # BLD AUTO: 10.2 10E9/L (ref 4–11)
WBC # BLD AUTO: 11.2 10E9/L (ref 4–11)
WBC # BLD AUTO: 12.2 10E9/L (ref 4–11)
WBC # BLD AUTO: 14.6 10E9/L (ref 4–11)
WBC # BLD AUTO: 15.1 10E9/L (ref 4–11)
WBC # BLD AUTO: 15.4 10E9/L (ref 4–11)
WBC #/AREA URNS AUTO: 3 /HPF (ref 0–5)
WBC #/AREA URNS AUTO: ABNORMAL /HPF

## 2019-01-01 PROCEDURE — 84484 ASSAY OF TROPONIN QUANT: CPT | Performed by: EMERGENCY MEDICINE

## 2019-01-01 PROCEDURE — 71046 X-RAY EXAM CHEST 2 VIEWS: CPT

## 2019-01-01 PROCEDURE — 80048 BASIC METABOLIC PNL TOTAL CA: CPT | Performed by: INTERNAL MEDICINE

## 2019-01-01 PROCEDURE — 80076 HEPATIC FUNCTION PANEL: CPT | Performed by: EMERGENCY MEDICINE

## 2019-01-01 PROCEDURE — 80048 BASIC METABOLIC PNL TOTAL CA: CPT | Performed by: STUDENT IN AN ORGANIZED HEALTH CARE EDUCATION/TRAINING PROGRAM

## 2019-01-01 PROCEDURE — 85018 HEMOGLOBIN: CPT | Performed by: STUDENT IN AN ORGANIZED HEALTH CARE EDUCATION/TRAINING PROGRAM

## 2019-01-01 PROCEDURE — 70450 CT HEAD/BRAIN W/O DYE: CPT

## 2019-01-01 PROCEDURE — 99223 1ST HOSP IP/OBS HIGH 75: CPT | Performed by: NURSE PRACTITIONER

## 2019-01-01 PROCEDURE — 25000132 ZZH RX MED GY IP 250 OP 250 PS 637: Performed by: NURSE PRACTITIONER

## 2019-01-01 PROCEDURE — 81001 URINALYSIS AUTO W/SCOPE: CPT | Performed by: INTERNAL MEDICINE

## 2019-01-01 PROCEDURE — 80048 BASIC METABOLIC PNL TOTAL CA: CPT | Performed by: EMERGENCY MEDICINE

## 2019-01-01 PROCEDURE — 85027 COMPLETE CBC AUTOMATED: CPT | Performed by: INTERNAL MEDICINE

## 2019-01-01 PROCEDURE — 85520 HEPARIN ASSAY: CPT | Performed by: INTERNAL MEDICINE

## 2019-01-01 PROCEDURE — 99285 EMERGENCY DEPT VISIT HI MDM: CPT | Mod: 25

## 2019-01-01 PROCEDURE — 75635 CT ANGIO ABDOMINAL ARTERIES: CPT

## 2019-01-01 PROCEDURE — 12000000 ZZH R&B MED SURG/OB

## 2019-01-01 PROCEDURE — 85520 HEPARIN ASSAY: CPT | Performed by: STUDENT IN AN ORGANIZED HEALTH CARE EDUCATION/TRAINING PROGRAM

## 2019-01-01 PROCEDURE — 25000132 ZZH RX MED GY IP 250 OP 250 PS 637: Performed by: STUDENT IN AN ORGANIZED HEALTH CARE EDUCATION/TRAINING PROGRAM

## 2019-01-01 PROCEDURE — 71275 CT ANGIOGRAPHY CHEST: CPT

## 2019-01-01 PROCEDURE — 99239 HOSP IP/OBS DSCHRG MGMT >30: CPT | Performed by: HOSPITALIST

## 2019-01-01 PROCEDURE — 36415 COLL VENOUS BLD VENIPUNCTURE: CPT | Performed by: INTERNAL MEDICINE

## 2019-01-01 PROCEDURE — 25000132 ZZH RX MED GY IP 250 OP 250 PS 637: Performed by: INTERNAL MEDICINE

## 2019-01-01 PROCEDURE — 99207 ZZC CDG-MDM COMPONENT: MEETS MODERATE - UP CODED: CPT | Performed by: HOSPITALIST

## 2019-01-01 PROCEDURE — 85025 COMPLETE CBC W/AUTO DIFF WBC: CPT | Performed by: EMERGENCY MEDICINE

## 2019-01-01 PROCEDURE — 36415 COLL VENOUS BLD VENIPUNCTURE: CPT | Performed by: STUDENT IN AN ORGANIZED HEALTH CARE EDUCATION/TRAINING PROGRAM

## 2019-01-01 PROCEDURE — 87086 URINE CULTURE/COLONY COUNT: CPT | Performed by: INTERNAL MEDICINE

## 2019-01-01 PROCEDURE — 99239 HOSP IP/OBS DSCHRG MGMT >30: CPT | Performed by: STUDENT IN AN ORGANIZED HEALTH CARE EDUCATION/TRAINING PROGRAM

## 2019-01-01 PROCEDURE — 87086 URINE CULTURE/COLONY COUNT: CPT | Performed by: EMERGENCY MEDICINE

## 2019-01-01 PROCEDURE — 85048 AUTOMATED LEUKOCYTE COUNT: CPT | Performed by: STUDENT IN AN ORGANIZED HEALTH CARE EDUCATION/TRAINING PROGRAM

## 2019-01-01 PROCEDURE — 85018 HEMOGLOBIN: CPT | Performed by: INTERNAL MEDICINE

## 2019-01-01 PROCEDURE — 96361 HYDRATE IV INFUSION ADD-ON: CPT

## 2019-01-01 PROCEDURE — 85520 HEPARIN ASSAY: CPT | Performed by: EMERGENCY MEDICINE

## 2019-01-01 PROCEDURE — 99223 1ST HOSP IP/OBS HIGH 75: CPT | Mod: AI | Performed by: INTERNAL MEDICINE

## 2019-01-01 PROCEDURE — 85610 PROTHROMBIN TIME: CPT | Performed by: EMERGENCY MEDICINE

## 2019-01-01 PROCEDURE — 99214 OFFICE O/P EST MOD 30 MIN: CPT | Mod: 25 | Performed by: INTERNAL MEDICINE

## 2019-01-01 PROCEDURE — 83605 ASSAY OF LACTIC ACID: CPT | Performed by: STUDENT IN AN ORGANIZED HEALTH CARE EDUCATION/TRAINING PROGRAM

## 2019-01-01 PROCEDURE — 25000128 H RX IP 250 OP 636: Performed by: INTERNAL MEDICINE

## 2019-01-01 PROCEDURE — 85520 HEPARIN ASSAY: CPT | Performed by: HOSPITALIST

## 2019-01-01 PROCEDURE — 99214 OFFICE O/P EST MOD 30 MIN: CPT | Performed by: INTERNAL MEDICINE

## 2019-01-01 PROCEDURE — G0008 ADMIN INFLUENZA VIRUS VAC: HCPCS | Performed by: INTERNAL MEDICINE

## 2019-01-01 PROCEDURE — 25000125 ZZHC RX 250: Performed by: EMERGENCY MEDICINE

## 2019-01-01 PROCEDURE — 25000128 H RX IP 250 OP 636: Performed by: STUDENT IN AN ORGANIZED HEALTH CARE EDUCATION/TRAINING PROGRAM

## 2019-01-01 PROCEDURE — 99207 ZZC OFFICE-HOSPITAL ADMIT: CPT | Performed by: INTERNAL MEDICINE

## 2019-01-01 PROCEDURE — 80053 COMPREHEN METABOLIC PANEL: CPT | Performed by: EMERGENCY MEDICINE

## 2019-01-01 PROCEDURE — 99231 SBSQ HOSP IP/OBS SF/LOW 25: CPT | Performed by: HOSPITALIST

## 2019-01-01 PROCEDURE — 96376 TX/PRO/DX INJ SAME DRUG ADON: CPT

## 2019-01-01 PROCEDURE — 25800030 ZZH RX IP 258 OP 636: Performed by: INTERNAL MEDICINE

## 2019-01-01 PROCEDURE — 93005 ELECTROCARDIOGRAM TRACING: CPT

## 2019-01-01 PROCEDURE — 36415 COLL VENOUS BLD VENIPUNCTURE: CPT | Performed by: HOSPITALIST

## 2019-01-01 PROCEDURE — 96374 THER/PROPH/DIAG INJ IV PUSH: CPT | Mod: 59

## 2019-01-01 PROCEDURE — 85730 THROMBOPLASTIN TIME PARTIAL: CPT | Performed by: HOSPITALIST

## 2019-01-01 PROCEDURE — 96365 THER/PROPH/DIAG IV INF INIT: CPT

## 2019-01-01 PROCEDURE — 83690 ASSAY OF LIPASE: CPT | Performed by: EMERGENCY MEDICINE

## 2019-01-01 PROCEDURE — 85730 THROMBOPLASTIN TIME PARTIAL: CPT | Performed by: INTERNAL MEDICINE

## 2019-01-01 PROCEDURE — 93010 ELECTROCARDIOGRAM REPORT: CPT | Performed by: INTERNAL MEDICINE

## 2019-01-01 PROCEDURE — 25000128 H RX IP 250 OP 636: Performed by: EMERGENCY MEDICINE

## 2019-01-01 PROCEDURE — 90662 IIV NO PRSV INCREASED AG IM: CPT | Performed by: INTERNAL MEDICINE

## 2019-01-01 PROCEDURE — 36415 COLL VENOUS BLD VENIPUNCTURE: CPT | Performed by: EMERGENCY MEDICINE

## 2019-01-01 PROCEDURE — 25000132 ZZH RX MED GY IP 250 OP 250 PS 637: Performed by: HOSPITALIST

## 2019-01-01 PROCEDURE — 85027 COMPLETE CBC AUTOMATED: CPT | Performed by: STUDENT IN AN ORGANIZED HEALTH CARE EDUCATION/TRAINING PROGRAM

## 2019-01-01 PROCEDURE — 97110 THERAPEUTIC EXERCISES: CPT | Mod: GP

## 2019-01-01 PROCEDURE — 81001 URINALYSIS AUTO W/SCOPE: CPT | Performed by: EMERGENCY MEDICINE

## 2019-01-01 PROCEDURE — 99232 SBSQ HOSP IP/OBS MODERATE 35: CPT | Performed by: HOSPITALIST

## 2019-01-01 PROCEDURE — 99223 1ST HOSP IP/OBS HIGH 75: CPT | Mod: AI | Performed by: STUDENT IN AN ORGANIZED HEALTH CARE EDUCATION/TRAINING PROGRAM

## 2019-01-01 PROCEDURE — 87040 BLOOD CULTURE FOR BACTERIA: CPT | Performed by: EMERGENCY MEDICINE

## 2019-01-01 PROCEDURE — 25000125 ZZHC RX 250: Performed by: INTERNAL MEDICINE

## 2019-01-01 PROCEDURE — 97161 PT EVAL LOW COMPLEX 20 MIN: CPT | Mod: GP

## 2019-01-01 PROCEDURE — 99232 SBSQ HOSP IP/OBS MODERATE 35: CPT | Performed by: STUDENT IN AN ORGANIZED HEALTH CARE EDUCATION/TRAINING PROGRAM

## 2019-01-01 RX ORDER — BISACODYL 10 MG
10 SUPPOSITORY, RECTAL RECTAL DAILY PRN
DISCHARGE
Start: 2019-01-01

## 2019-01-01 RX ORDER — LISINOPRIL 5 MG/1
5 TABLET ORAL EVERY 24 HOURS
Status: ON HOLD | COMMUNITY
Start: 2014-08-07 | End: 2019-01-01

## 2019-01-01 RX ORDER — LORAZEPAM 1 MG/1
1 TABLET ORAL EVERY 6 HOURS PRN
Qty: 30 TABLET | Refills: 0 | Status: SHIPPED | OUTPATIENT
Start: 2019-01-01 | End: 2019-01-01

## 2019-01-01 RX ORDER — OXYCODONE HYDROCHLORIDE 100 MG/5ML
10 SOLUTION ORAL ONCE
Status: COMPLETED | OUTPATIENT
Start: 2019-01-01 | End: 2019-01-01

## 2019-01-01 RX ORDER — HALOPERIDOL 2 MG/ML
.5-1 SOLUTION ORAL EVERY 6 HOURS PRN
Status: DISCONTINUED | OUTPATIENT
Start: 2019-01-01 | End: 2019-01-01 | Stop reason: HOSPADM

## 2019-01-01 RX ORDER — LEVOTHYROXINE SODIUM 25 UG/1
25 TABLET ORAL DAILY
Qty: 90 TABLET | Refills: 0 | Status: ON HOLD | OUTPATIENT
Start: 2019-01-01 | End: 2019-01-01

## 2019-01-01 RX ORDER — ONDANSETRON 4 MG/1
4 TABLET, ORALLY DISINTEGRATING ORAL EVERY 6 HOURS PRN
Status: DISCONTINUED | OUTPATIENT
Start: 2019-01-01 | End: 2019-01-01 | Stop reason: HOSPADM

## 2019-01-01 RX ORDER — ATROPINE SULFATE 10 MG/ML
2 SOLUTION/ DROPS OPHTHALMIC
Qty: 1 BOTTLE | Refills: 0 | Status: SHIPPED | OUTPATIENT
Start: 2019-01-01

## 2019-01-01 RX ORDER — OXYCODONE HYDROCHLORIDE 5 MG/1
5 TABLET ORAL EVERY 6 HOURS PRN
Qty: 12 TABLET | Refills: 0 | Status: ON HOLD | OUTPATIENT
Start: 2019-01-01 | End: 2019-01-01

## 2019-01-01 RX ORDER — BISACODYL 10 MG
10 SUPPOSITORY, RECTAL RECTAL DAILY PRN
Status: DISCONTINUED | OUTPATIENT
Start: 2019-01-01 | End: 2019-01-01 | Stop reason: HOSPADM

## 2019-01-01 RX ORDER — SODIUM CHLORIDE 9 MG/ML
INJECTION, SOLUTION INTRAVENOUS CONTINUOUS
Status: DISCONTINUED | OUTPATIENT
Start: 2019-01-01 | End: 2019-01-01

## 2019-01-01 RX ORDER — ONDANSETRON 2 MG/ML
4 INJECTION INTRAMUSCULAR; INTRAVENOUS EVERY 6 HOURS PRN
Status: DISCONTINUED | OUTPATIENT
Start: 2019-01-01 | End: 2019-01-01 | Stop reason: HOSPADM

## 2019-01-01 RX ORDER — LIDOCAINE 40 MG/G
CREAM TOPICAL
Status: DISCONTINUED | OUTPATIENT
Start: 2019-01-01 | End: 2019-01-01 | Stop reason: HOSPADM

## 2019-01-01 RX ORDER — ACETAMINOPHEN 325 MG/1
650 TABLET ORAL EVERY 4 HOURS PRN
Status: DISCONTINUED | OUTPATIENT
Start: 2019-01-01 | End: 2019-01-01 | Stop reason: HOSPADM

## 2019-01-01 RX ORDER — NALOXONE HYDROCHLORIDE 0.4 MG/ML
.1-.4 INJECTION, SOLUTION INTRAMUSCULAR; INTRAVENOUS; SUBCUTANEOUS
Status: DISCONTINUED | OUTPATIENT
Start: 2019-01-01 | End: 2019-01-01 | Stop reason: HOSPADM

## 2019-01-01 RX ORDER — OXYCODONE HYDROCHLORIDE 100 MG/5ML
15 SOLUTION ORAL
Refills: 0 | Status: SHIPPED | DISCHARGE
Start: 2019-01-01 | End: 2019-01-01

## 2019-01-01 RX ORDER — NALOXONE HYDROCHLORIDE 0.4 MG/ML
.1-.4 INJECTION, SOLUTION INTRAMUSCULAR; INTRAVENOUS; SUBCUTANEOUS
Status: DISCONTINUED | OUTPATIENT
Start: 2019-01-01 | End: 2019-01-01

## 2019-01-01 RX ORDER — OXYCODONE HYDROCHLORIDE 100 MG/5ML
10-20 SOLUTION ORAL
Status: DISCONTINUED | OUTPATIENT
Start: 2019-01-01 | End: 2019-01-01

## 2019-01-01 RX ORDER — GLYCOPYRROLATE 0.2 MG/ML
.2-.4 INJECTION, SOLUTION INTRAMUSCULAR; INTRAVENOUS EVERY 4 HOURS PRN
Status: DISCONTINUED | OUTPATIENT
Start: 2019-01-01 | End: 2019-01-01 | Stop reason: HOSPADM

## 2019-01-01 RX ORDER — OXYCODONE HYDROCHLORIDE 100 MG/5ML
10-15 SOLUTION ORAL
Status: DISCONTINUED | OUTPATIENT
Start: 2019-01-01 | End: 2019-01-01 | Stop reason: HOSPADM

## 2019-01-01 RX ORDER — SACCHAROMYCES BOULARDII 250 MG
250 CAPSULE ORAL 2 TIMES DAILY
Status: ON HOLD | COMMUNITY
End: 2019-01-01

## 2019-01-01 RX ORDER — AMOXICILLIN 250 MG
2 CAPSULE ORAL 2 TIMES DAILY PRN
Status: DISCONTINUED | OUTPATIENT
Start: 2019-01-01 | End: 2019-01-01

## 2019-01-01 RX ORDER — ATROPINE SULFATE 10 MG/ML
1-2 SOLUTION/ DROPS OPHTHALMIC
Status: DISCONTINUED | OUTPATIENT
Start: 2019-01-01 | End: 2019-01-01 | Stop reason: HOSPADM

## 2019-01-01 RX ORDER — OXYCODONE HYDROCHLORIDE 5 MG/1
5-10 TABLET ORAL
Status: DISCONTINUED | OUTPATIENT
Start: 2019-01-01 | End: 2019-01-01

## 2019-01-01 RX ORDER — PROCHLORPERAZINE MALEATE 5 MG
5 TABLET ORAL EVERY 6 HOURS PRN
Status: DISCONTINUED | OUTPATIENT
Start: 2019-01-01 | End: 2019-01-01 | Stop reason: HOSPADM

## 2019-01-01 RX ORDER — HYDROMORPHONE HYDROCHLORIDE 1 MG/ML
0.5 INJECTION, SOLUTION INTRAMUSCULAR; INTRAVENOUS; SUBCUTANEOUS
Status: DISCONTINUED | OUTPATIENT
Start: 2019-01-01 | End: 2019-01-01

## 2019-01-01 RX ORDER — LEVOTHYROXINE SODIUM 25 UG/1
25 TABLET ORAL DAILY
Qty: 90 TABLET | Refills: 1 | Status: CANCELLED | OUTPATIENT
Start: 2019-01-01

## 2019-01-01 RX ORDER — AMOXICILLIN 250 MG
1-2 CAPSULE ORAL 2 TIMES DAILY
Status: DISCONTINUED | OUTPATIENT
Start: 2019-01-01 | End: 2019-01-01 | Stop reason: HOSPADM

## 2019-01-01 RX ORDER — CIPROFLOXACIN 250 MG/1
250 TABLET, FILM COATED ORAL 2 TIMES DAILY
Qty: 14 TABLET | Refills: 0 | Status: SHIPPED | OUTPATIENT
Start: 2019-01-01 | End: 2019-01-01

## 2019-01-01 RX ORDER — PROCHLORPERAZINE 25 MG
12.5 SUPPOSITORY, RECTAL RECTAL EVERY 12 HOURS PRN
Status: DISCONTINUED | OUTPATIENT
Start: 2019-01-01 | End: 2019-01-01 | Stop reason: HOSPADM

## 2019-01-01 RX ORDER — LEVOTHYROXINE SODIUM 25 UG/1
25 TABLET ORAL DAILY
Status: DISCONTINUED | OUTPATIENT
Start: 2019-01-01 | End: 2019-01-01 | Stop reason: HOSPADM

## 2019-01-01 RX ORDER — IOPAMIDOL 755 MG/ML
100 INJECTION, SOLUTION INTRAVASCULAR ONCE
Status: COMPLETED | OUTPATIENT
Start: 2019-01-01 | End: 2019-01-01

## 2019-01-01 RX ORDER — LORAZEPAM 0.5 MG/1
.5-1 TABLET ORAL EVERY 4 HOURS PRN
Qty: 15 TABLET | Refills: 0 | Status: SHIPPED | DISCHARGE
Start: 2019-01-01

## 2019-01-01 RX ORDER — IOPAMIDOL 755 MG/ML
58 INJECTION, SOLUTION INTRAVASCULAR ONCE
Status: COMPLETED | OUTPATIENT
Start: 2019-01-01 | End: 2019-01-01

## 2019-01-01 RX ORDER — AMOXICILLIN 250 MG
1 CAPSULE ORAL 2 TIMES DAILY PRN
Status: DISCONTINUED | OUTPATIENT
Start: 2019-01-01 | End: 2019-01-01

## 2019-01-01 RX ORDER — OXYCODONE HYDROCHLORIDE 100 MG/5ML
15 SOLUTION ORAL
Qty: 1 BOTTLE | Refills: 0 | Status: SHIPPED | DISCHARGE
Start: 2019-01-01

## 2019-01-01 RX ORDER — OXYCODONE HYDROCHLORIDE 100 MG/5ML
10-15 SOLUTION ORAL
Status: DISCONTINUED | OUTPATIENT
Start: 2019-01-01 | End: 2019-01-01

## 2019-01-01 RX ORDER — MORPHINE SULFATE 10 MG/5ML
10 SOLUTION ORAL EVERY 4 HOURS PRN
Qty: 40 ML | Refills: 0 | Status: SHIPPED | OUTPATIENT
Start: 2019-01-01 | End: 2019-01-01

## 2019-01-01 RX ORDER — LORAZEPAM 2 MG/ML
1 CONCENTRATE ORAL
Qty: 30 ML | Refills: 0 | Status: SHIPPED | OUTPATIENT
Start: 2019-01-01 | End: 2019-01-01

## 2019-01-01 RX ORDER — OXYCODONE HYDROCHLORIDE 100 MG/5ML
10 SOLUTION ORAL
Qty: 1 BOTTLE | Refills: 0 | Status: SHIPPED | OUTPATIENT
Start: 2019-01-01 | End: 2019-01-01

## 2019-01-01 RX ORDER — LEVOTHYROXINE SODIUM 25 UG/1
25 TABLET ORAL DAILY
Qty: 90 TABLET | Refills: 1 | Status: SHIPPED | OUTPATIENT
Start: 2019-01-01 | End: 2019-01-01

## 2019-01-01 RX ORDER — POLYETHYLENE GLYCOL 3350 17 G/17G
17 POWDER, FOR SOLUTION ORAL DAILY PRN
Status: DISCONTINUED | OUTPATIENT
Start: 2019-01-01 | End: 2019-01-01 | Stop reason: HOSPADM

## 2019-01-01 RX ORDER — SENNOSIDES 8.6 MG
1 TABLET ORAL DAILY PRN
DISCHARGE
Start: 2019-01-01

## 2019-01-01 RX ORDER — HALOPERIDOL 0.5 MG/1
.5-1 TABLET ORAL EVERY 6 HOURS PRN
DISCHARGE
Start: 2019-01-01

## 2019-01-01 RX ORDER — LORAZEPAM 0.5 MG/1
.5-1 TABLET ORAL EVERY 4 HOURS PRN
Status: SHIPPED | DISCHARGE
Start: 2019-01-01 | End: 2019-01-01

## 2019-01-01 RX ORDER — LORAZEPAM 2 MG/ML
.5-1 CONCENTRATE ORAL
Status: DISCONTINUED | OUTPATIENT
Start: 2019-01-01 | End: 2019-01-01 | Stop reason: HOSPADM

## 2019-01-01 RX ADMIN — OXYCODONE HYDROCHLORIDE 15 MG: 100 SOLUTION ORAL at 10:55

## 2019-01-01 RX ADMIN — OXYCODONE HYDROCHLORIDE 10 MG: 100 SOLUTION ORAL at 18:06

## 2019-01-01 RX ADMIN — ACETAMINOPHEN 650 MG: 325 TABLET, FILM COATED ORAL at 22:35

## 2019-01-01 RX ADMIN — OXYCODONE HYDROCHLORIDE 15 MG: 100 SOLUTION ORAL at 14:45

## 2019-01-01 RX ADMIN — OXYCODONE HYDROCHLORIDE 15 MG: 100 SOLUTION ORAL at 14:19

## 2019-01-01 RX ADMIN — OXYCODONE HYDROCHLORIDE 15 MG: 100 SOLUTION ORAL at 12:45

## 2019-01-01 RX ADMIN — OXYCODONE HYDROCHLORIDE 15 MG: 100 SOLUTION ORAL at 01:08

## 2019-01-01 RX ADMIN — ACETAMINOPHEN 650 MG: 325 TABLET, FILM COATED ORAL at 08:53

## 2019-01-01 RX ADMIN — OXYCODONE HYDROCHLORIDE 15 MG: 100 SOLUTION ORAL at 21:32

## 2019-01-01 RX ADMIN — OXYCODONE HYDROCHLORIDE 15 MG: 100 SOLUTION ORAL at 18:41

## 2019-01-01 RX ADMIN — OXYCODONE HYDROCHLORIDE 10 MG: 100 SOLUTION ORAL at 23:08

## 2019-01-01 RX ADMIN — OXYCODONE HYDROCHLORIDE 5 MG: 5 TABLET ORAL at 11:41

## 2019-01-01 RX ADMIN — LEVOTHYROXINE SODIUM 25 MCG: 25 TABLET ORAL at 06:45

## 2019-01-01 RX ADMIN — Medication 4350 UNITS: at 14:23

## 2019-01-01 RX ADMIN — SENNOSIDES AND DOCUSATE SODIUM 1 TABLET: 8.6; 5 TABLET ORAL at 21:33

## 2019-01-01 RX ADMIN — Medication 1 MG: at 22:43

## 2019-01-01 RX ADMIN — ACETAMINOPHEN 650 MG: 325 TABLET, FILM COATED ORAL at 15:39

## 2019-01-01 RX ADMIN — OXYCODONE HYDROCHLORIDE 15 MG: 100 SOLUTION ORAL at 09:11

## 2019-01-01 RX ADMIN — OXYCODONE HYDROCHLORIDE 10 MG: 100 SOLUTION ORAL at 15:58

## 2019-01-01 RX ADMIN — OXYCODONE HYDROCHLORIDE 15 MG: 100 SOLUTION ORAL at 08:45

## 2019-01-01 RX ADMIN — HEPARIN SODIUM 950 UNITS/HR: 10000 INJECTION, SOLUTION INTRAVENOUS at 15:29

## 2019-01-01 RX ADMIN — OXYCODONE HYDROCHLORIDE 10 MG: 100 SOLUTION ORAL at 21:23

## 2019-01-01 RX ADMIN — SENNOSIDES AND DOCUSATE SODIUM 1 TABLET: 8.6; 5 TABLET ORAL at 13:18

## 2019-01-01 RX ADMIN — APIXABAN 10 MG: 5 TABLET, FILM COATED ORAL at 08:01

## 2019-01-01 RX ADMIN — HEPARIN SODIUM 950 UNITS/HR: 10000 INJECTION, SOLUTION INTRAVENOUS at 14:24

## 2019-01-01 RX ADMIN — LEVOTHYROXINE SODIUM 25 MCG: 25 TABLET ORAL at 08:53

## 2019-01-01 RX ADMIN — OXYCODONE HYDROCHLORIDE 15 MG: 100 SOLUTION ORAL at 07:56

## 2019-01-01 RX ADMIN — ACETAMINOPHEN 650 MG: 325 TABLET, FILM COATED ORAL at 21:56

## 2019-01-01 RX ADMIN — OXYCODONE HYDROCHLORIDE 15 MG: 100 SOLUTION ORAL at 10:05

## 2019-01-01 RX ADMIN — HEPARIN SODIUM 950 UNITS/HR: 10000 INJECTION, SOLUTION INTRAVENOUS at 11:07

## 2019-01-01 RX ADMIN — ACETAMINOPHEN 650 MG: 325 TABLET, FILM COATED ORAL at 05:20

## 2019-01-01 RX ADMIN — OXYCODONE HYDROCHLORIDE 15 MG: 100 SOLUTION ORAL at 03:16

## 2019-01-01 RX ADMIN — SODIUM CHLORIDE 80 ML: 9 INJECTION, SOLUTION INTRAVENOUS at 15:02

## 2019-01-01 RX ADMIN — HEPARIN SODIUM 950 UNITS/HR: 10000 INJECTION, SOLUTION INTRAVENOUS at 19:06

## 2019-01-01 RX ADMIN — OXYCODONE HYDROCHLORIDE 15 MG: 100 SOLUTION ORAL at 16:44

## 2019-01-01 RX ADMIN — ACETAMINOPHEN 650 MG: 325 TABLET, FILM COATED ORAL at 20:13

## 2019-01-01 RX ADMIN — ACETAMINOPHEN 650 MG: 325 TABLET, FILM COATED ORAL at 05:30

## 2019-01-01 RX ADMIN — Medication 4250 UNITS: at 19:05

## 2019-01-01 RX ADMIN — ACETAMINOPHEN 650 MG: 325 TABLET, FILM COATED ORAL at 21:28

## 2019-01-01 RX ADMIN — SODIUM CHLORIDE 1000 ML: 9 INJECTION, SOLUTION INTRAVENOUS at 17:26

## 2019-01-01 RX ADMIN — OXYCODONE HYDROCHLORIDE 5 MG: 5 TABLET ORAL at 11:01

## 2019-01-01 RX ADMIN — LEVOTHYROXINE SODIUM 25 MCG: 25 TABLET ORAL at 08:52

## 2019-01-01 RX ADMIN — ACETAMINOPHEN 650 MG: 325 TABLET, FILM COATED ORAL at 17:54

## 2019-01-01 RX ADMIN — IOPAMIDOL 58 ML: 755 INJECTION, SOLUTION INTRAVENOUS at 12:02

## 2019-01-01 RX ADMIN — IOPAMIDOL 100 ML: 755 INJECTION, SOLUTION INTRAVENOUS at 15:02

## 2019-01-01 RX ADMIN — SENNOSIDES AND DOCUSATE SODIUM 2 TABLET: 8.6; 5 TABLET ORAL at 21:23

## 2019-01-01 RX ADMIN — ACETAMINOPHEN 650 MG: 325 TABLET, FILM COATED ORAL at 09:23

## 2019-01-01 RX ADMIN — ACETAMINOPHEN 650 MG: 325 TABLET, FILM COATED ORAL at 13:18

## 2019-01-01 RX ADMIN — OXYCODONE HYDROCHLORIDE 15 MG: 100 SOLUTION ORAL at 05:44

## 2019-01-01 RX ADMIN — ACETAMINOPHEN 650 MG: 325 TABLET, FILM COATED ORAL at 00:45

## 2019-01-01 RX ADMIN — ACETAMINOPHEN 650 MG: 325 TABLET, FILM COATED ORAL at 10:17

## 2019-01-01 RX ADMIN — OXYCODONE HYDROCHLORIDE 5 MG: 5 TABLET ORAL at 07:47

## 2019-01-01 RX ADMIN — Medication 1 MG: at 01:16

## 2019-01-01 RX ADMIN — OXYCODONE HYDROCHLORIDE 15 MG: 100 SOLUTION ORAL at 07:18

## 2019-01-01 RX ADMIN — ACETAMINOPHEN 650 MG: 325 TABLET, FILM COATED ORAL at 01:16

## 2019-01-01 RX ADMIN — ACETAMINOPHEN 650 MG: 325 TABLET, FILM COATED ORAL at 20:38

## 2019-01-01 RX ADMIN — SODIUM CHLORIDE 85 ML: 9 INJECTION, SOLUTION INTRAVENOUS at 12:02

## 2019-01-01 RX ADMIN — OXYCODONE HYDROCHLORIDE 15 MG: 100 SOLUTION ORAL at 18:04

## 2019-01-01 RX ADMIN — SODIUM CHLORIDE: 9 INJECTION, SOLUTION INTRAVENOUS at 19:55

## 2019-01-01 RX ADMIN — SENNOSIDES AND DOCUSATE SODIUM 2 TABLET: 8.6; 5 TABLET ORAL at 08:59

## 2019-01-01 RX ADMIN — ACETAMINOPHEN 650 MG: 325 TABLET, FILM COATED ORAL at 00:08

## 2019-01-01 RX ADMIN — APIXABAN 10 MG: 5 TABLET, FILM COATED ORAL at 21:56

## 2019-01-01 ASSESSMENT — ENCOUNTER SYMPTOMS
SHORTNESS OF BREATH: 1
CARDIOVASCULAR NEGATIVE: 1
SHORTNESS OF BREATH: 1
DIAPHORESIS: 0
NAUSEA: 0
ABDOMINAL PAIN: 0
NUMBNESS: 0
MYALGIAS: 1
COLOR CHANGE: 1
FATIGUE: 1
CHILLS: 0
FEVER: 0
WEAKNESS: 1
GASTROINTESTINAL NEGATIVE: 1
DIARRHEA: 0
RESPIRATORY NEGATIVE: 1
WEAKNESS: 1
COLOR CHANGE: 1
ARTHRALGIAS: 1

## 2019-01-01 ASSESSMENT — ACTIVITIES OF DAILY LIVING (ADL)
ADLS_ACUITY_SCORE: 17
ADLS_ACUITY_SCORE: 17
ADLS_ACUITY_SCORE: 15
ADLS_ACUITY_SCORE: 18
ADLS_ACUITY_SCORE: 15
ADLS_ACUITY_SCORE: 15
ADLS_ACUITY_SCORE: 13
ADLS_ACUITY_SCORE: 15
ADLS_ACUITY_SCORE: 15
ADLS_ACUITY_SCORE: 16
ADLS_ACUITY_SCORE: 15
ADLS_ACUITY_SCORE: 17
ADLS_ACUITY_SCORE: 16
FALL_HISTORY_WITHIN_LAST_SIX_MONTHS: YES
ADLS_ACUITY_SCORE: 17
RETIRED_COMMUNICATION: 0-->UNDERSTANDS/COMMUNICATES WITHOUT DIFFICULTY
ADLS_ACUITY_SCORE: 15
ADLS_ACUITY_SCORE: 17
DRESS: 0-->INDEPENDENT
ADLS_ACUITY_SCORE: 17
NUMBER_OF_TIMES_PATIENT_HAS_FALLEN_WITHIN_LAST_SIX_MONTHS: 1
COGNITION: 0 - NO COGNITION ISSUES REPORTED
ADLS_ACUITY_SCORE: 17
ADLS_ACUITY_SCORE: 14
ADLS_ACUITY_SCORE: 17
RETIRED_EATING: 0-->INDEPENDENT
TOILETING: 0-->INDEPENDENT
SWALLOWING: 0-->SWALLOWS FOODS/LIQUIDS WITHOUT DIFFICULTY
ADLS_ACUITY_SCORE: 17
ADLS_ACUITY_SCORE: 17
ADLS_ACUITY_SCORE: 15
ADLS_ACUITY_SCORE: 15
ADLS_ACUITY_SCORE: 16
ADLS_ACUITY_SCORE: 16
ADLS_ACUITY_SCORE: 17
ADLS_ACUITY_SCORE: 17
ADLS_ACUITY_SCORE: 15
ADLS_ACUITY_SCORE: 15
ADLS_ACUITY_SCORE: 17
ADLS_ACUITY_SCORE: 15
BATHING: 0-->INDEPENDENT
ADLS_ACUITY_SCORE: 15
ADLS_ACUITY_SCORE: 17
AMBULATION: 1-->ASSISTIVE EQUIPMENT
WHICH_OF_THE_ABOVE_FUNCTIONAL_RISKS_HAD_A_RECENT_ONSET_OR_CHANGE?: FALL HISTORY;AMBULATION
TRANSFERRING: 0-->INDEPENDENT
ADLS_ACUITY_SCORE: 17
ADLS_ACUITY_SCORE: 16

## 2019-01-01 ASSESSMENT — MIFFLIN-ST. JEOR
SCORE: 940.66
SCORE: 972.42
SCORE: 940.66
SCORE: 957
SCORE: 937.49

## 2019-02-26 NOTE — TELEPHONE ENCOUNTER
"Requested Prescriptions   Pending Prescriptions Disp Refills     levothyroxine (SYNTHROID/LEVOTHROID) 25 MCG tablet 90 tablet 3     Sig: Take 1 tablet (25 mcg) by mouth daily    Thyroid Protocol Passed - 2/26/2019  9:42 AM       Passed - Patient is 12 years or older       Passed - Recent (12 mo) or future (30 days) visit within the authorizing provider's specialty    Patient had office visit in the last 12 months or has a visit in the next 30 days with authorizing provider or within the authorizing provider's specialty.  See \"Patient Info\" tab in inbasket, or \"Choose Columns\" in Meds & Orders section of the refill encounter.             Passed - Medication is active on med list       Passed - Normal TSH on file in past 12 months    Recent Labs   Lab Test 10/31/18  1001   TSH 2.98             Passed - No active pregnancy on record    If patient is pregnant or has had a positive pregnancy test, please check TSH.         Passed - No positive pregnancy test in past 12 months    If patient is pregnant or has had a positive pregnancy test, please check TSH.          Last Written Prescription Date:  10/31/18  Last Fill Quantity: 90,  # refills: 3   Last office visit: 10/31/2018 with prescribing provider:  10/31/18   Future Office Visit:      "

## 2019-02-26 NOTE — TELEPHONE ENCOUNTER
Daughter of patient called and stated this medication Synthroid needs to be sent to   [place] [address]  [place], [place].  [phone]

## 2019-07-09 NOTE — PROGRESS NOTES
Subjective     Eliana Lambert is a 89 year old female who presents to clinic today for the following health issues:    HPI   URINARY TRACT SYMPTOMS  The patient informs me that for the last day or so she has had some frequency and hesitancy of urination but no back pain or fever.  She defines to the nurse below the noted history:      Duration: 1 day    Description  frequency, urgency, nocturia x 5-6, hesitancy and minimal back pain    Intensity:  moderate    Accompanying signs and symptoms:  Fever/chills: no   Flank pain YES  Nausea and vomiting: no   Vaginal symptoms: none  Abdominal/Pelvic Pain: no     History  History of frequent UTI's: no   History of kidney stones: no   Sexually Active: no   Possibility of pregnancy: No    Precipitating or alleviating factors: None    Therapies tried and outcome: none   Outcome: NA      Patient Active Problem List   Diagnosis     Hyperlipidemia LDL goal <130     UTI (urinary tract infection)     Other specified hypothyroidism     Knee pain, unspecified laterality     Essential hypertension with goal blood pressure less than 140/90     Atrial fibrillation, unspecified type (H)     Bladder cancer (H)     ACP (advance care planning)     Hypothyroidism, unspecified type     Past Surgical History:   Procedure Laterality Date     CHOLECYSTECTOMY       CYSTOSCOPY, TRANSURETHRAL RESECTION (TUR) TUMOR BLADDER, COMBINED N/A 7/6/2017    Procedure: COMBINED CYSTOSCOPY, TRANSURETHRAL RESECTION (TUR) TUMOR BLADDER;  VIDEO CYSTOSCOPY, TRANSURETHRAL RESECTION (TUR) LARGE TUMOR BLADDER;  Surgeon: Artemio Mcclain MD;  Location:  OR     D & C         Social History     Tobacco Use     Smoking status: Never Smoker     Smokeless tobacco: Never Used   Substance Use Topics     Alcohol use: Yes     Comment: occassional      No family history on file.      Current Outpatient Medications   Medication Sig Dispense Refill     aspirin 81 MG chewable tablet Take 325 mg by mouth daily        B  Complex-C (SUPER B COMPLEX PO) Take 1 tablet by mouth daily        calcium carbonate (OS-BRADEN 500 MG [place]. CA) 500 MG tablet Take 500 mg by mouth 2 times daily       Cranberry 1000 MG CAPS Take 1 capsule by mouth daily        LECITHIN PO Take 1 tablet by mouth daily        levothyroxine (SYNTHROID/LEVOTHROID) 25 MCG tablet Take 1 tablet (25 mcg) by mouth daily 90 tablet 1     Allergies   Allergen Reactions     Aleve [Naproxen] Hives     Clindamycin GI Disturbance     Neosporin      Skin reaction     Shrimp      hives     Sulfa Drugs      nausea     Penicillins Rash     BP Readings from Last 3 Encounters:   10/31/18 116/70   07/26/17 140/78   07/11/17 108/72    Wt Readings from Last 3 Encounters:   10/31/18 63.6 kg (140 lb 4.8 oz)   07/26/17 68 kg (150 lb)   07/11/17 68 kg (150 lb)           Reviewed and updated as needed this visit by Provider         Review of Systems   ROS COMP: CONSTITUTIONAL: NEGATIVE for fever, chills, change in weight  ENT/MOUTH: NEGATIVE for ear, mouth and throat problems  RESP: NEGATIVE for significant cough or SOB  CV: NEGATIVE for chest pain, palpitations or peripheral edema  GI: NEGATIVE for nausea, abdominal pain, heartburn, or change in bowel habits  MUSCULOSKELETAL: NEGATIVE for significant arthralgias or myalgia  NEURO: NEGATIVE for weakness, dizziness or paresthesias  HEME: NEGATIVE for bleeding problems  PSYCHIATRIC: NEGATIVE for changes in mood or affect      Objective    /68   Pulse 94   Temp 97.5  F (36.4  C) (Oral)   Resp 14   Ht 1.524 m (5')   Wt 63 kg (139 lb)   SpO2 99%   BMI 27.15 kg/m    Body mass index is 27.15 kg/m .  Physical Exam   GENERAL: alert and no distress  EYES: Eyes grossly normal to inspection, PERRL and conjunctivae and sclerae normal  HENT: ear canals and TM's normal, nose and mouth without ulcers or lesions  NECK: no adenopathy, no asymmetry, masses, or scars and thyroid normal to palpation  RESP: lungs clear to auscultation - no rales, rhonchi  or wheezes  CV: regular rate and rhythm, normal S1 S2, no click or rub, no peripheral edema and peripheral pulses strong  ABDOMEN: soft, nontender, no hepatosplenomegaly, no masses and bowel sounds normal.  There is no flank pain  MS: no gross musculoskeletal defects noted  NEURO: No focal changes, gait slowed and deliberate  PSYCH: mentation appears normal, affect normal/bright.    Creatinine   Date Value Ref Range Status   10/31/2018 0.92 0.52 - 1.04 mg/dL Final         Assessment & Plan     1. Dysuria  Presumed urinary tract infection and will empirically treat pending urine analysis and culture.  Discussed with patient and she has taken Cipro floxacillin in the past without issues.  - *UA reflex to Microscopic and Culture ([place] and [facility] (except [place] and [place])  - ciprofloxacin (CIPRO) 250 MG tablet; Take 1 tablet (250 mg) by mouth 2 times daily  Dispense: 14 tablet; Refill: 0    2. Essential hypertension with goal blood pressure less than 140/90  Stable continuing with current medical management.      See Patient Instructions    Return in about 1 week (around 7/16/2019) for if symptoms recur or worsen.    Lowell Robertson MD  [facility]

## 2019-08-08 NOTE — TELEPHONE ENCOUNTER
Hand written letter and referral form received from [facility] (Belen Hendricks- care transition RN). Patients daughter Ting reached out to hospice to see if they would take/ see patient. Daughter has noticed declines over the last month or so, increased shortness of breath, fatigue, weight loss, and back pain. Daughter thinks patients cancer she had previously has spread. Daughter thinks patient wouldn't want to treat if she knew that's what is was.     [facility] is hoping to get hospice services started and is requesting referral and records from PCP. Please advise. Forms in PCP's folder for review.

## 2019-08-08 NOTE — TELEPHONE ENCOUNTER
Reason for Call:  Form, our goal is to have forms completed with 72 hours, however, some forms may require a visit or additional information.    Type of letter, form or note:  Hospice    Who is the form from?: Belen Hendricks ([facility]) (if other please explain)    Where did the form come from: Belen FLORES    What clinic location was the form placed at?: Internal Medicine    Where the form was placed: Given to MA/RN    What number is listed as a contact on the form?: Belen FLORES ([phone])       Additional comments: please complete form and fax to highlighted number at the top ([phone])    Call taken on 8/8/2019 at 12:54 PM by Du Childress

## 2019-08-08 NOTE — TELEPHONE ENCOUNTER
unclear if requested referral for hospice is [place] affiliated, if so may place for cosign

## 2019-08-12 NOTE — TELEPHONE ENCOUNTER
Spoke with Belen ZAYAS. [facility] is not a [place] [facility] but a private company.   Adjusted referral to state requesting [facility]. Please sign after review if appropriate.  (Did not see form or letter in folder or in faxed file.) JORGE LUIS Boone, [facility]

## 2019-08-20 NOTE — PROGRESS NOTES
Subjective     Eliana Lambert is a 89 year old female who presents to clinic today for the following health issues:    HPI     Patient states that she normally sees a cardiologist in [facility] for atrial fibrillation.  She has not been treated for her atrial fibrillation less from what I can gather she was advised to take Eliquis but chose not to do so due to cost.  She has never required a rate inducing medication and apparently per patient has had an echocardiogram in the past which is defined is normal for which results are not available.  She states that she saw her cardiologist about 2 months ago and no changes were made.    The patient is accompanied by her daughter today who states that over the last couple months she is noted increasing difficulty with exertion when she walks.  This is not associated with any chest pain.  She is able to sleep comfortably and lay down flat without any orthopnea or PND.  She is also had no pedal edema.  She reports that she does not have any issues of fevers chills cough or productive sputum.  According to the daughter she feels that her symptoms are a little bit more prominent now than the used to be but the patient states that at times she feels well and at times she notes symptoms but she does not feel that her whole lot worse than they have been over the last several months.    RESPIRATORY SYMPTOMS      Duration: a few months     Description  Shortness of breath/ fatigue with walking short distances. Patient states she recently developed a runny nose (in the last week)    Severity: moderate    Accompanying signs and symptoms: None    History (predisposing factors):  Afib    Precipitating or alleviating factors: None    Therapies tried and outcome:  none       Patient Active Problem List   Diagnosis     Hyperlipidemia LDL goal <130     UTI (urinary tract infection)     Other specified hypothyroidism     Knee pain, unspecified laterality     Essential hypertension  with goal blood pressure less than 140/90     Atrial fibrillation, unspecified type (H)     Bladder cancer (H)     ACP (advance care planning)     Hypothyroidism, unspecified type     Past Surgical History:   Procedure Laterality Date     CHOLECYSTECTOMY       CYSTOSCOPY, TRANSURETHRAL RESECTION (TUR) TUMOR BLADDER, COMBINED N/A 7/6/2017    Procedure: COMBINED CYSTOSCOPY, TRANSURETHRAL RESECTION (TUR) TUMOR BLADDER;  VIDEO CYSTOSCOPY, TRANSURETHRAL RESECTION (TUR) LARGE TUMOR BLADDER;  Surgeon: Artemio Mcclain MD;  Location:  OR     D & C         Social History     Tobacco Use     Smoking status: Never Smoker     Smokeless tobacco: Never Used   Substance Use Topics     Alcohol use: Yes     Comment: occassional      No family history on file.      Current Outpatient Medications   Medication Sig Dispense Refill     aspirin 81 MG chewable tablet Take 325 mg by mouth daily        B Complex-C (SUPER B COMPLEX PO) Take 1 tablet by mouth daily        calcium carbonate (OS-BRADEN 500 MG [place]. [place]) 500 MG tablet Take 500 mg by mouth 2 times daily       ciprofloxacin (CIPRO) 250 MG tablet Take 1 tablet (250 mg) by mouth 2 times daily 14 tablet 0     Cranberry 1000 MG CAPS Take 1 capsule by mouth daily        LECITHIN PO Take 1 tablet by mouth daily        levothyroxine (SYNTHROID/LEVOTHROID) 25 MCG tablet Take 1 tablet (25 mcg) by mouth daily 90 tablet 1     Allergies   Allergen Reactions     Aleve [Naproxen] Hives     Clindamycin GI Disturbance     Neosporin      Skin reaction     Shrimp      hives     Sulfa Drugs      nausea     Penicillins Rash     BP Readings from Last 3 Encounters:   07/09/19 124/68   10/31/18 116/70   07/26/17 140/78    Wt Readings from Last 3 Encounters:   07/09/19 63 kg (139 lb)   10/31/18 63.6 kg (140 lb 4.8 oz)   07/26/17 68 kg (150 lb)           Reviewed and updated as needed this visit by Provider         Review of Systems   ROS COMP: CONSTITUTIONAL: NEGATIVE for fever, chills, change in  weight  ENT/MOUTH: NEGATIVE for ear, mouth and throat problems  CV: NEGATIVE for chest pain, palpitations or peripheral edema  GI: NEGATIVE for nausea, abdominal pain, heartburn, or change in bowel habits  : NEGATIVE for frequency, dysuria, or hematuria  MUSCULOSKELETAL: NEGATIVE for significant arthralgias or myalgia  NEURO: NEGATIVE for weakness, dizziness or paresthesias  HEME: NEGATIVE for bleeding problems  PSYCHIATRIC: NEGATIVE for changes in mood or affect      Objective    /62   Pulse 97   Temp 97.3  F (36.3  C) (Tympanic)   Resp 17   Wt 60.9 kg (134 lb 4.8 oz)   SpO2 98%   BMI 26.23 kg/m    Body mass index is 26.23 kg/m .  Physical Exam   GENERAL: alert and no distress  EYES: Eyes grossly normal to inspection, PERRL and conjunctivae and sclerae normal  HENT: ear canals and TM's normal, nose and mouth without ulcers or lesions  NECK: no adenopathy, no asymmetry, masses, or scars and thyroid normal to palpation  RESP: lungs clear to auscultation - no rales, rhonchi or wheezes  CV: irregular rate and rhythm, normal S1 S2, no click or rub, slight soft ejection murmur noted but no peripheral edema and peripheral pulses strong  MS: no gross musculoskeletal defects noted, no edema, there is no calf swelling and the Homans sign is negative peer  NEURO: Normal strength and tone, mentation intact and speech normal  PSYCH: mentation appears normal, affect normal/bright      TSH   Date Value Ref Range Status   10/31/2018 2.98 0.40 - 4.00 mU/L Final       Assessment & Plan     (R06.00) Dyspnea, unspecified type  (primary encounter diagnosis)  Comment: I discussed with the patient that based on her symptoms we probably need to pursue whether or not they could be related to a cardiovascular source.  Whether or not she is having a tachycardic response in regards to her atrial fibrillation with exertion or atypical ischemia manifested as dyspnea I informed the patient that at minimum she needs an  echocardiogram as well as a nuclear stress test.  I informed her that due to the outpatient status I can't get this done quickly and advised that if she feels warranted that we could bring her over to the hospital and admit her but she does not feel that that is needed at this point.  She has been advised if her symptoms worsen abruptly to be seen.  I also recommended a basic panel, a chest x-ray and hemoglobin today.  She really does not manifest any classic symptoms of angina less dyspnea which appears to be intermittent.  We also discussed the benefit of potentially seeing a cardiologist  Plan: NM Exercise stress test, Echocardiogram         Complete, XR Chest 2 Views, Basic metabolic         panel, Hemoglobin            (E03.9) Hypothyroidism, unspecified type  Comment: TSH noted to be stable within 12 months.  Plan:     BMI:   Estimated body mass index is 27.15 kg/m  as calculated from the following:    Height as of 7/9/19: 1.524 m (5').    Weight as of 7/9/19: 63 kg (139 lb).     See Patient Instructions    Return in about 1 week (around 8/27/2019) for if symptoms recur or worsen, diagnostic test as ordered.    Lowell Robertson MD  [facility]

## 2019-08-20 NOTE — LETTER
As Jennifer Aguirre RN was applying the 5 FU pump.  Scottie stated his lower leg was having spasms. Scottie proceeded to show Jennifer and it was noticeable   upon assessment that his leg was actively evette.    [facility]  [address] [address]  [phone]      8/21/2019       Eliana Lambert  [address] [place]   [facility] [address]        Dear Eliana,    Your basic panel, hemoglobin and chest x-ray all returned normal.    Sincerely,      Lowell Robertson MD  Internal Medicine

## 2019-09-14 PROBLEM — E03.9 HYPOTHYROIDISM, UNSPECIFIED TYPE: Status: ACTIVE | Noted: 2018-01-01

## 2019-09-14 PROBLEM — I26.99 PULMONARY EMBOLISM (H): Status: ACTIVE | Noted: 2019-01-01

## 2019-09-14 NOTE — ED PROVIDER NOTES
"  History     Chief Complaint:  Rib Pain    HPI   Eliana Lambert is a 89 year old female who presents via EMS for the evaluation of rib pain. EMS reports that the patient started to experience a sharp under her right breast at 0530 this morning, prompting her to call 911. EMS states that the patient lives by herself at home and denies any recent trauma that could be causing her pain. EMS also notes that the patient received 0.5 mg of Dilaudid IM at 0924 en route to the ED. The patient reports that her current pain level is a 4/10. The patient also notes that she has been experiencing shortness of breath with exertion over the past 2-3 months and states that this has been gradually worsening. The patient denies chest pain, leg swelling or pain, nausea, and other issues. No history of blood clot.     Allergies:  Aleve  Clindamycin  Neosporin  Sulfa drugs  Penicillins      Medications:    Aspirin  Levothyroxine     Past Medical History:    CKD  Osteoarthritis  Spider veins  Hypothyroidism  Atrial fibrillation   Hyperlipidemia  UTI     Past Surgical History:    Cholecystectomy     Family History:    History reviewed. No pertinent family history.      Social History:  Smoking status: Never Smoker  Alcohol use: Yes  Drug use: No    Marital Status:   [5]     Review of Systems   Respiratory: Positive for shortness of breath.    Cardiovascular: Negative for chest pain and leg swelling.   Gastrointestinal: Negative for nausea.   Musculoskeletal: Positive for arthralgias and myalgias.   10 point review of systems performed and is negative except as above and in HPI.       Physical Exam     Patient Vitals for the past 24 hrs:   BP Temp Temp src Pulse Resp SpO2 Height Weight   09/14/19 1340 104/76 -- -- 104 -- 96 % -- --   09/14/19 0945 108/76 98.1  F (36.7  C) Oral 51 16 95 % 1.549 m (5' 1\") 63.5 kg (140 lb)        Physical Exam  General: Resting on the gurney, appears uncomfortable  Head:  The scalp, face, and head " appear normal  Mouth/Throat: Mucus membranes are moist  CV:  Regular rate    Normal S1 and S2  No pathological murmur   Resp:  Breath sounds clear and equal bilaterally    Obvious shortness of breath. Tachypnea.     No coarseness    No wheezing   GI:  Abdomen is soft, no rigidity    Minimal right upper quadrant tenderness to palpation.   MS:  Normal motor assessment of all extremities.    Good capillary refill noted.  Skin:   No rash or lesions noted.  Neuro:  Speech is normal and fluent. No apparent deficit.  Psych:  Awake. Alert.  Normal affect.      Appropriate interactions.     Emergency Department Course   Imaging:  Radiographic findings were communicated with the patient and family who voiced understanding of the findings.  CT Chest Pulmonary Embolism w Contrast  IMPRESSION:  1. Bilateral pulmonary emboli.  2. Retrocrural adenopathy. Multiple small mediastinal nodes.  3. Liver lesions, indeterminate, but concerning for liver metastasis.  As read by Radiology.     Laboratory:  CBC: WBC 12.2 (H), WNL (HGB 13.9, )  CMP: Albumin 3.1 (L), WNL (Creatinine 0.68)  Lipase: 73  Troponin (0957): <0.015    Interventions:  1423, heparin Loading Dose bolus, 4,350 Units, IV  1424, heparin infusion, 25,000 Units, IV    Emergency Department Course:  Patient arrived via ambulance.     Past medical records, nursing notes, and vitals reviewed.  0940: I performed an exam of the patient and obtained history, as documented above.     IV inserted and blood drawn.     The patient was sent for a chest CT while in the emergency department, findings above.    1322: I rechecked the patient. Explained findings to patient and son.     1345: Discussed the patient with Dr. Murcia, who will admit the patient to a medical telemetry bed for further monitoring, evaluation, and treatment.  Findings and plan explained to the Patient and son who consents to admission.     Impression & Plan    Medical Decision Making:  Eliana Lambert is a  89 year old female who presents for evaluation of acute right rib pain, which was pleuritic in nature.  Given the severity of her symptoms, the patient underwent CT study which confirmed clinical suspicion of PE.  The CT also showed evidence of metastatic cancer, which was discussed with the patient and is likely related to prior bladder cancer for which she did not want aggressive cares.  No prior history or PE.    The patient was started on heparin protocol for PE and was admitted to the hospitalist in hemodynamically stable condition.     Diagnosis:    ICD-10-CM    1. Acute pulmonary embolism without acute cor pulmonale, unspecified pulmonary embolism type (H) I26.99      Disposition:  Admitted to [facility] telemetry bed.     Scribe Disclosure:  INghia, am serving as a scribe at 9:49 AM on 9/14/2019 to document services personally performed by Kenia Hall MD based on my observations and the provider's statements to me.      Nghia Delgado  9/14/2019    EMERGENCY DEPARTMENT       Kenia Hall MD  09/16/19 0955

## 2019-09-14 NOTE — PHARMACY-ADMISSION MEDICATION HISTORY
Admission medication history interview status for the 9/14/2019  admission is complete. See EPIC admission navigator for prior to admission medications     Medication history source reliability:Good Patient had medication list    Actions taken by pharmacist (provider contacted, etc):None     Additional medication history information not noted on PTA med list :None    Medication reconciliation/reorder completed by provider prior to medication history? No    Time spent in this activity: 10 minutes    Prior to Admission medications    Medication Sig Last Dose Taking? Auth Provider   aspirin (ASA) 325 MG EC tablet Take 325 mg by mouth daily 9/14/2019 Yes Unknown, Entered By History   B Complex-C (SUPER B COMPLEX PO) Take 1 tablet by mouth daily  9/14/2019 Yes Reported, Patient   calcium carbonate (OS-BRADEN) 1500 (600 Ca) MG tablet Take 1,200 mg by mouth daily (2 tablets) 9/14/2019 Yes Unknown, Entered By History   Cranberry 300 MG TABS Take 1 tablet by mouth daily 9/14/2019 Yes Unknown, Entered By History   levothyroxine (SYNTHROID/LEVOTHROID) 25 MCG tablet Take 1 tablet (25 mcg) by mouth daily Due for labs for further refills 9/14/2019 Yes Lowell Robertson MD   Multiple Vitamins-Minerals (PRESERVISION AREDS PO) Take 1 tablet by mouth 2 times daily 9/14/2019 Yes Unknown, Entered By History   NONFORMULARY Chlorest Off 450 mg supplement - Take 2 tablets by mouth twice daily 9/14/2019 Yes Unknown, Entered By History

## 2019-09-14 NOTE — H&P
[facility]    History and Physical - Hospitalist Service       Date of Admission:  9/14/2019    Assessment & Plan   Eliana Lambert is a 89 year old female admitted on 9/14/2019. She presents with dyspnea in setting of pulmonary embolism.      Pulmonary embolism (H)    KAY    Assessment: Presents with 2-month history of dyspnea with exertion with superimposed acute right sided breast pain.   CT chest shows bilateral pulmonary emboli. Retrocrural adenopathy. Multiple small mediastinal nodes.  Overall her symptoms are consistent with a pulmonary embolism.  She is hemodynamically stable, nontoxic-appearing.  At this time she is relatively admitted.    Plan:   -Admit inpatient  -Heparin infusion  -Obtain echocardiogram  -Follow vital/temp  -Discussed as anticoagulation prior to discharge, ideally should be on Lovenox given her malignancy.       Hyperlipidemia LDL goal <130    Assessment/Plan: Not on statin, follows outpatient      Essential hypertension with goal blood pressure less than 140/90    Assessment: Not on blood pressure medications as outpatient    Plan:   -Follow vitals for now      Atrial fibrillation, un history specified type (H)    Assessment: History noted, EKG in progress    Plan:   -Currently on heparin for antiregulation.       Bladder cancer (H)    Assessment: Follows with Dr. Mcclain, history of T1 transitional cell carcinoma the bladder.  Diagnosed in 2017. At that time, patient decided that she wishes to have no treatment or follow-up. CT this admission, shows liver lesions, indeterminate, but concerning for liver metastasis likely from her bladder cancer. She has good insight into her goals, DNR/DNI, and wishes for no surgery/chemotherapy intervention.    Plan:    -Follow with urology as outpatient per patient wishes.      Hypothyroidism, unspecified type    Assessment/Plan: Continue PTA Synthroid once verified         Diet: Combination Diet Regular Diet Adult    DVT  Prophylaxis: Heparin  Goff Catheter: not present  Code Status: DNR/DNI      Disposition Plan   Expected discharge: 1-2 Days, recommended to prior living arrangement once anticogulation strategy finalized, symptomatic improvement.  Entered: Flo Murcia MD 09/14/2019, 3:19 PM     The patient's care was discussed with the Bedside Nurse, Patient and ED Provider.    Flo Murcia MD  [facility]    ______________________________________________________________________    Chief Complaint     Right breast pain  SOB    History is obtained from the patient    History of Present Illness      Eliana Lambert is a 89 year old female with past medical history of bladder cancer, hypertension, hypothyroidism, hyperlipidemia presents for evaluation of right-sided chest pain and dyspnea with exertion.    Patient also for the past 2 months she has significant dyspnea with exertion, over the past couple weeks, she is noticed worsening of her symptoms.  She reports she often walks to her daughter's house down the street, but during this time she is admitted much more frequent rest than her baseline.  She denies any coughing, she denies any shortness of breath at rest.  She denies any centralized chest pain with radiation.  Her right-sided breast pain started roughly at 5:30 AM on the morning of admission.  She denies any fevers or chills, she denies any nausea/vomiting or abdominal pain.  She denies any lower extremity swelling or calf pain.  She denies any recent blood in her stool, no weight loss or night sweats.  She reports that in regards to her bladder cancer, she follows with Dr. Mcclain, and that she wishes no intervention including surgery or chemotherapy.  She has not  followed up with urology in quite some time.  She denies any hematuria, she denies any urinary complaints, no urgency/frequency/hematuria.  She otherwise has no other complaints this time.    Review of Systems    The 10 point Review of Systems  is negative other than noted in the HPI or here.     Past Medical History    I have reviewed this patient's medical history and updated it with pertinent information if needed.   Past Medical History:   Diagnosis Date     Chronic kidney disease      Osteoarthritis     knees     Spider veins      Thyroid disease        Past Surgical History   I have reviewed this patient's surgical history and updated it with pertinent information if needed.  Past Surgical History:   Procedure Laterality Date     CHOLECYSTECTOMY       CYSTOSCOPY, TRANSURETHRAL RESECTION (TUR) TUMOR BLADDER, COMBINED N/A 7/6/2017    Procedure: COMBINED CYSTOSCOPY, TRANSURETHRAL RESECTION (TUR) TUMOR BLADDER;  VIDEO CYSTOSCOPY, TRANSURETHRAL RESECTION (TUR) LARGE TUMOR BLADDER;  Surgeon: Artemio Mcclain MD;  Location:  OR     [facility] C         Social History   I have reviewed this patient's social history and updated it with pertinent information if needed.  Social History     Tobacco Use     Smoking status: Never Smoker     Smokeless tobacco: Never Used   Substance Use Topics     Alcohol use: Yes     Comment: occassional      Drug use: No       Family History   I have reviewed this patient's family history and updated it with pertinent information if needed.   Family History   Problem Relation Age of Onset     No Known Problems Mother      No Known Problems Father      Prior to Admission Medications   Prior to Admission Medications   Prescriptions Last Dose Informant Patient Reported? Taking?   B Complex-C (SUPER B COMPLEX PO) 9/14/2019 Self Yes Yes   Sig: Take 1 tablet by mouth daily    Cranberry 300 MG TABS 9/14/2019 Self Yes Yes   Sig: Take 1 tablet by mouth daily   Multiple Vitamins-Minerals (PRESERVISION AREDS PO) 9/14/2019 Self Yes Yes   Sig: Take 1 tablet by mouth 2 times daily   NONFORMULARY 9/14/2019 Self Yes Yes   Sig: Chlorest Off 450 mg supplement - Take 2 tablets by mouth twice daily   aspirin (ASA) 325 MG EC tablet 9/14/2019 Self Yes  Yes   Sig: Take 325 mg by mouth daily   calcium carbonate (OS-BRADEN) 1500 (600 Ca) MG tablet 9/14/2019 Self Yes Yes   Sig: Take 1,200 mg by mouth daily (2 tablets)   levothyroxine (SYNTHROID/LEVOTHROID) 25 MCG tablet 9/14/2019 Self No Yes   Sig: Take 1 tablet (25 mcg) by mouth daily Due for labs for further refills      Facility-Administered Medications: None     Allergies   Allergies   Allergen Reactions     Aleve [Naproxen] Hives     Clindamycin GI Disturbance     Neosporin      Skin reaction     Shrimp      hives     Sulfa Drugs      nausea     Penicillins Rash       Physical Exam   Vital Signs: Temp: 95.4  F (35.2  C) Temp src: Oral BP: 93/59 Pulse: 101   Resp: 16 SpO2: 95 % O2 Device: None (Room air)    Weight: 140 lbs 0 oz    Constitutional: awake, alert, cooperative, no apparent distress.   Eyes: Lids and lashes normal, pupils equal, round and reactive to light   ENT: Normocephalic, without obvious abnormality, atraumatic, sinuses nontender on palpation   Hematologic / Lymphatic: no cervical lymphadenopathy   Respiratory: CTABL   Cardiovascular: Tachycardic with regular rhythm with no m/r/g   GI: Normal bowel sounds, soft, non-distended, non-tender.   Skin: normal skin color, texture, turgor   Musculoskeletal: There is no redness, warmth, or swelling of the joints. Full range of motion noted.   Neurologic: Awake, alert, oriented to name, place and time. Cranial nerves II-XII are grossly intact. Motor is 5 out of 5 bilaterally. Sensory is intact.   Neuropsychiatric: normal mood and affect    Data   Data reviewed today: I reviewed all medications, new labs and imaging results over the last 24 hours. I personally reviewed the chest CT image(s) showing see below.    Most Recent 3 CBC's:  Recent Labs   Lab Test 09/14/19  0957 08/20/19  1542 10/31/18  1001 06/28/17  1037 05/20/17  1030 06/03/16  0905   WBC 12.2*  --   --   --  7.9 7.7   HGB 13.9 13.5 15.1 14.6 16.1* 14.2   MCV 93  --   --   --  93 89     --    --  280 246 307     Most Recent 3 BMP's:  Recent Labs   Lab Test 09/14/19  0957 08/20/19  1542 10/31/18  1001    136 139   POTASSIUM 4.0 4.1 4.5   CHLORIDE 102 103 104   CO2 27 26 28   BUN 14 19 16   CR 0.68 0.78 0.92   ANIONGAP 7 7 7   BRADEN 9.3 9.1 9.4   GLC 94 80 85     Most Recent 2 LFT's:  Recent Labs   Lab Test 09/14/19  0957 10/31/18  1001   AST 29 18   ALT 24 23   ALKPHOS 139 100   BILITOTAL 0.6 0.6     Most Recent 3 Troponin's:  Recent Labs   Lab Test 09/14/19  0957   TROPI <0.015     Recent Results (from the past 24 hour(s))   CT Chest Pulmonary Embolism w Contrast   Result Value    Radiologist flags Bilateral pulmonary emboli (AA)    Narrative    CT CHEST PULMONARY EMBOLISM WITH CONTRAST9/14/2019 12:17 PM    HISTORY: Right lower quadrant pleuritic pain, worsening shortness of  breath.     TECHNIQUE: Axial images from thoracic inlet to diaphragm. 58mL  Isovue-370 IV contrast. Radiation dose for this scan was reduced using  automated exposure control, adjustment of the mA and/or kV according  to patient size, or iterative reconstruction technique.    COMPARISON: 8/20/2019 chest x-ray.    FINDINGS:   Chest: Multiple bilateral pulmonary emboli in upper mid and lower  lungs extending proximally to the bifurcation of upper lobe and lower  lobe pulmonary arteries, but no main pulmonary artery emboli or  evidence for saddle embolus.    [Critical Result: Bilateral pulmonary emboli]  Finding was identified on 9/14/2019 12:32 PM.   Dr. Dominique was contacted by me on 9/14/2019 12:42 PM and verbalized  understanding of the critical result.      Small bilateral pleural effusions. Few very small indeterminate  pulmonary nodules under 0.6 cm in size, for example, in the right  upper lobe series 6 image 23. Discoid atelectasis left lung base. No  evidence for thoracic aortic dissection, although suboptimal aortic  dissection. Indeterminate hypodense left thyroid nodule 1.6 cm.    Right retrocrural adenopathy and 1.5  cm nodule or node medial right  lung base series 5 image 95. Multiple hypodense liver lesions are  incompletely characterized, but concerning for liver metastasis and  new since 5/20/2017 CT abdomen and pelvis. Low dense lesion lateral  left kidney is incompletely evaluated on this exam, but may represent  a small renal cyst. Stable slight sclerosis right L3 vertebral body  since 2017 consistent with a benign etiology. No aggressive bone  lesions.      Impression    IMPRESSION:  1. Bilateral pulmonary emboli.  2. Retrocrural adenopathy. Multiple small mediastinal nodes.  3. Liver lesions, indeterminate, but concerning for liver metastasis.    NORBERTO GREEN MD

## 2019-09-14 NOTE — ED NOTES
"[facility]  ED Nurse Handoff Report    ED Chief complaint: Rib Pain      ED Diagnosis:   Final diagnoses:   Acute pulmonary embolism without acute cor pulmonale, unspecified pulmonary embolism type (H)       Code Status: hospital to address     Allergies:   Allergies   Allergen Reactions     Aleve [Naproxen] Hives     Clindamycin GI Disturbance     Neosporin      Skin reaction     Shrimp      hives     Sulfa Drugs      nausea     Penicillins Rash       Activity level - Baseline/Home:  Independent  Activity Level - Current :  Pt was able to stand bedside with SBA     Patient's Preferred language: eng    Needed?: No    Isolation: No  Infection: Not Applicable  Bariatric?: No    Vital Signs:   Vitals:    09/14/19 0945   BP: 108/76   Pulse: 51   Resp: 16   Temp: 98.1  F (36.7  C)   TempSrc: Oral   SpO2: 95%   Weight: 63.5 kg (140 lb)   Height: 1.549 m (5' 1\")       Cardiac Rhythm: ,        Pain level: 0-10 Pain Scale: 4    Is this patient confused?: No   Does this patient have a guardian?  No         If yes, is there guardianship documents in the Epic \"Code/ACP\" activity?  N/A         Guardian Notified?  N/A  [place] - Suicide Severity Rating Scale Completed?  Yes  If yes, what color did the patient score?  White    Patient Report: Initial Complaint: right rib area pain that came on suddenly this morning without trauma reported or known .   Focused Assessment: alert and calm vitally stable   Tests Performed: labs, chest CT .   Abnormal Results:   Abnormal Labs Reviewed   CBC WITH PLATELETS DIFFERENTIAL - Abnormal; Notable for the following components:       Result Value    WBC 12.2 (*)     Absolute Neutrophil 9.3 (*)     All other components within normal limits   COMPREHENSIVE METABOLIC PANEL - Abnormal; Notable for the following components:    Albumin 3.1 (*)     All other components within normal limits     Treatments provided: admission , heparin drip     Family Comments: family present " and updated.     OBS brochure/video discussed/provided to patient/family: N/A              Name of person given brochure if not patient: na              Relationship to patient: na    ED Medications:   Medications   Saline Flush (85 mLs Intravenous Given 9/14/19 1202)   iopamidol (ISOVUE-370) solution 58 mL (58 mLs Intravenous Given 9/14/19 1202)       Drips infusing?:  Yes    For the majority of the shift this patient was Green.   Interventions performed were vitals, heparin drip , monitored.    Severe Sepsis OR Septic Shock Diagnosis Present: No    To be done/followed up on inpatient unit:  monitor     ED NURSE PHONE NUMBER: [phone]

## 2019-09-14 NOTE — PROVIDER NOTIFICATION
RECEIVING UNIT ED HANDOFF REVIEW    ED Nurse Handoff Report was reviewed by: Odalis Wilkins RN on September 14, 2019 at 2:37 PM

## 2019-09-14 NOTE — PLAN OF CARE
Pt arrived at 1430. AOx4. Tele: Afib. BP soft, HR tachy, other VSS on RA. C/o mild pain 2-5/10 R rib cage, PRN tylenol given x1. LS: diminished, KAY, SOB at times. Up SBA. L PIV infusing Heparin @9.5. Next hep 10a check at 2030. R PIV SL. Continue to monitor.

## 2019-09-14 NOTE — ED NOTES
Bed: ED13  Expected date:   Expected time:   Means of arrival:   Comments:  Maciel 516 Rib pain 89 f

## 2019-09-14 NOTE — ED TRIAGE NOTES
PER report from EMS pt started to have  sudden onset of right rib area pain. 6/10 - ems tried to get an iv x 2 but could not- given 0.5 dilaudid IM . Pain 4/10 .

## 2019-09-15 NOTE — PLAN OF CARE
Discharge Planner PT     PT: Orders received, eval completed, tx initiated.  Pt lives in an apartment alone with her daughter living down the honeycutt. At baseline ambulates IND in unit, uses walker for long community distances. IND with ADLs in unit, assist for cleaning. Assist out in community.    Patient plan for discharge: Home  Current status: t ambulates 250' SBA without AD, antalgic gait on R, but generally steady. Does not display obvious SOB during mobility, but fatigued following. SPO2 88%, recovers to 96% <5sec. Discussed recommendation for increased walker use for stability and to ease knee pain, but pt declines. Discussed importance of continued activity during admission with goal to ambulate 4x in halls to maintain strength, pt voices understanding. Anticipate safe discharge home with improving KAY with further medical management. Possible discharge tomorrow per chart.  Barriers to return to prior living situation: None  Recommendations for discharge: Home  Rationale for recommendations: Pt safely mobilizing at baseline mobility. Anticipate safe return home following further medical management. Expected short LOS.       Entered by: Marin Barragan 09/15/2019 4:56 PM     Physical Therapy Discharge Summary    Reason for therapy discharge:    All goals and outcomes met, no further needs identified.    Progress towards therapy goal(s). See goals on [facility] Plan [facility] electronic health record for goal details.  Goals met    Therapy recommendation(s):    Continue home exercise program.

## 2019-09-15 NOTE — PROGRESS NOTES
[facility]    Medicine Progress Note - Hospitalist Service       Date of Admission:  9/14/2019  Assessment & Plan   Eliana Lambert is a 89 year old female admitted on 9/14/2019. She presents with dyspnea in setting of pulmonary embolism.     Bilateral pulmonary embolism (H)  KAY    Assessment: Presents with 2-month history of dyspnea with exertion with superimposed acute right sided breast pain.   CT chest shows bilateral pulmonary emboli. Retrocrural adenopathy. Multiple small mediastinal nodes.  Overall her symptoms are consistent with a pulmonary embolism.  She is hemodynamically stable, nontoxic-appearing.  At this time she is relatively admitted.    Plan:   - Heparin infusion continued  - Obtain echocardiogram  - Follow vital/temp - stable  - Discussed as anticoagulation prior to discharge, discussed AC methods and pros n cons of warfarin, enoxaparin, NOAC etc - son at bedside; they're leaning towards new OAC, will request pharmacy liaison check for cost.     Hyperlipidemia LDL goal <130    Assessment/Plan: Not on statin, follows outpatient     Essential hypertension with goal blood pressure less than 140/90    Assessment: Not on blood pressure medications as outpatient  - stable     Atrial fibrillation, unspecified type (H)    Assessment: History noted, currently irreg irreg, nml rate, PTA asa only    Plan:   - Currently on heparin for anticoagulation.      Bladder cancer (H)    Assessment: Follows with Dr. Mcclain, history of T1 transitional cell carcinoma the bladder.  Diagnosed in 2017. At that time, patient decided that she wishes to have no treatment or follow-up. CT this admission, shows liver lesions, indeterminate, but concerning for liver metastasis likely from her bladder cancer. She has good insight into her goals, DNR/DNI, and wishes for no surgery/chemotherapy intervention.    Plan:    - Follow with urology as outpatient per patient wishes.     Hypothyroidism, unspecified  type    Assessment/Plan: Continue PTA Synthroid once verified       Diet: Combination Diet Regular Diet Adult    DVT Prophylaxis: Heparin gtt  Goff Catheter: not present  Code Status: DNR/DNI      Disposition Plan   Expected discharge: another day maybe 2, pending AC transition and PT/CC-SW eval and assistance  Entered: Abdon Harper MD 09/15/2019, 9:35 AM       The patient's care was discussed with the Bedside Nurse, Patient and Patient's Family.    Abdon Harper MD  Hospitalist Service  [facility]    ______________________________________________________________________    Interval History   No new complaints or concerns. No sob or chest pain. Son at bedside. Discussed anticoag options. Son and patient thinking assisted living may be in here future sooner than later however she does have kids reportedly 2 doors down in the same apt building    Data reviewed today: I reviewed all medications, new labs and imaging results over the last 24 hours. I personally reviewed no images or EKG's today.    Physical Exam   Vital Signs: Temp: 95.7  F (35.4  C) Temp src: Oral BP: 128/80 Pulse: 86 Heart Rate: 90 Resp: 16 SpO2: 95 % O2 Device: None (Room air)    Weight: 132 lbs 4.8 oz  Gen: NAD, pleasant  HEENT: Normocephalic, EOMI, MMM  Resp: no crackles,  no wheezes, no increased work of resp  CV: S1S2 heard, irreg irreg rhythm, reg rate, no pedal edema  Abdo: soft, nontender, nondistended, bowel sounds present  Ext: calves nontender, well perfused  Neuro: AAOx3, CN grossly intact, no facial asymmetry      Data   Recent Labs   Lab 09/15/19  0716 09/14/19  0957   WBC 11.2* 12.2*   HGB 12.6 13.9   MCV 92 93    237    136   POTASSIUM 4.0 4.0   CHLORIDE 106 102   CO2 26 27   BUN 17 14   CR 0.68 0.68   ANIONGAP 5 7   BRADEN 8.4* 9.3   * 94   ALBUMIN  --  3.1*   PROTTOTAL  --  7.4   BILITOTAL  --  0.6   ALKPHOS  --  139   ALT  --  24   AST  --  29   LIPASE  --  73   TROPI  --  <0.015      Recent Results (from the past 24 hour(s))   CT Chest Pulmonary Embolism w Contrast   Result Value    Radiologist flags Bilateral pulmonary emboli (AA)    Narrative    CT CHEST PULMONARY EMBOLISM WITH CONTRAST9/14/2019 12:17 PM    HISTORY: Right lower quadrant pleuritic pain, worsening shortness of  breath.     TECHNIQUE: Axial images from thoracic inlet to diaphragm. 58mL  Isovue-370 IV contrast. Radiation dose for this scan was reduced using  automated exposure control, adjustment of the mA and/or kV according  to patient size, or iterative reconstruction technique.    COMPARISON: 8/20/2019 chest x-ray.    FINDINGS:   Chest: Multiple bilateral pulmonary emboli in upper mid and lower  lungs extending proximally to the bifurcation of upper lobe and lower  lobe pulmonary arteries, but no main pulmonary artery emboli or  evidence for saddle embolus.    [Critical Result: Bilateral pulmonary emboli]  Finding was identified on 9/14/2019 12:32 PM.   Dr. Dominique was contacted by me on 9/14/2019 12:42 PM and verbalized  understanding of the critical result.      Small bilateral pleural effusions. Few very small indeterminate  pulmonary nodules under 0.6 cm in size, for example, in the right  upper lobe series 6 image 23. Discoid atelectasis left lung base. No  evidence for thoracic aortic dissection, although suboptimal aortic  dissection. Indeterminate hypodense left thyroid nodule 1.6 cm.    Right retrocrural adenopathy and 1.5 cm nodule or node medial right  lung base series 5 image 95. Multiple hypodense liver lesions are  incompletely characterized, but concerning for liver metastasis and  new since 5/20/2017 CT abdomen and pelvis. Low dense lesion lateral  left kidney is incompletely evaluated on this exam, but may represent  a small renal cyst. Stable slight sclerosis right L3 vertebral body  since 2017 consistent with a benign etiology. No aggressive bone  lesions.      Impression    IMPRESSION:  1. Bilateral  pulmonary emboli.  2. Retrocrural adenopathy. Multiple small mediastinal nodes.  3. Liver lesions, indeterminate, but concerning for liver metastasis.    NORBERTO GREEN MD

## 2019-09-15 NOTE — PROGRESS NOTES
09/15/19 1600   Quick Adds   Type of Visit Initial PT Evaluation   Living Environment   Lives With alone   Living Arrangements apartment   Home Accessibility no concerns   Transportation Anticipated family or friend will provide   Living Environment Comment Daughter lives down the honeycutt in same apartment   Self-Care   Usual Activity Tolerance moderate   Current Activity Tolerance moderate   Regular Exercise   (walking, keeps active in general)   Equipment Currently Used at Home walker, rolling   Activity/Exercise/Self-Care Comment IND with ADLs, cooks, does not drive but still does things in community   Functional Level Prior   Ambulation 0-->independent   Transferring 0-->independent   Toileting 0-->independent   Bathing 1-->assistive equipment   Fall history within last six months no   Which of the above functional risks had a recent onset or change?   (activity tolerance)   Prior Functional Level Comment Pt ambulates in apartment without AD. Uses a walker if knows it will be long commuinity distance   General Information   Onset of Illness/Injury or Date of Surgery - Date 09/14/19   Referring Physician Abdon Harper MD   Patient/Family Goals Statement Home   Pertinent History of Current Problem (include personal factors and/or comorbidities that impact the POC) 89 year old female admitted on 9/14/2019. She presents with dyspnea in setting of pulmonary embolism. Hx bladder cx, a-fib. Chronic B knee pain   General Info Comments pt is [facility]   Cognitive Status Examination   Orientation orientation to person, place and time   Level of Consciousness alert   Follows Commands and Answers Questions 100% of the time   Pain Assessment   Patient Currently in Pain   (chornic knee pain with mobiltiy R>L)   Posture    Posture Forward head position   Strength   Strength Comments NT, BLE >3/5 functionally   Bed Mobility   Bed Mobility Comments IND supine<>sit   Transfer Skills   Transfer Comments IND sit<>stand   Gait  "  Gait Comments SBA without AD, antalgic on RLE, but generally steady   Balance   Balance Comments Good dynamic balance without UE support   Sensory Examination   Sensory Perception no deficits were identified   Clinical Impression   Criteria for Skilled Therapeutic Intervention yes, treatment indicated   PT Diagnosis Decreased activity tolerance   Influenced by the following impairments PE   Functional limitations due to impairments Decreased functional endurance   Clinical Presentation Stable/Uncomplicated   Clinical Presentation Rationale Clinical judgement   Clinical Decision Making (Complexity) Low complexity   Therapy Frequency 1x/week   Predicted Duration of Therapy Intervention (days/wks) 1 day   Anticipated Discharge Disposition Home   Risk & Benefits of therapy have been explained Yes   Patient, Family & other staff in agreement with plan of care Yes   Clinical Impression Comments Pt safely mobilizing at baseline mobility. 1 tx to discuss progressive activity tolerance, AD recomendations   [facility] [employer]-PAC TM \"6 Clicks\"   2016, Trustees of [facility], under license to [employer].  All rights reserved.   6 Clicks Short Forms Basic Mobility Inpatient Short Form   [facility] AM-PAC  \"6 Clicks\" V.2 Basic Mobility Inpatient Short Form   1. Turning from your back to your side while in a flat bed without using bedrails? 4 - None   2. Moving from lying on your back to sitting on the side of a flat bed without using bedrails? 4 - None   3. Moving to and from a bed to a chair (including a wheelchair)? 4 - None   4. Standing up from a chair using your arms (e.g., wheelchair, or bedside chair)? 4 - None   5. To walk in hospital room? 4 - None   6. Climbing 3-5 steps with a railing? 3 - A Little   Basic Mobility Raw Score (Score out of 24.Lower scores equate to lower levels of function) 23   Total Evaluation Time   Total Evaluation Time (Minutes) 9     "

## 2019-09-15 NOTE — PROGRESS NOTES
SW:    D/A: Sw acknowledged consults, did not complete. Waiting on PT recommendations.  P: AD following.    KRYSTINA Joe

## 2019-09-15 NOTE — PLAN OF CARE
Primary Diagnosis: 9/14 Bilateral PE  Orientation: AOx4  Aggression Stop Light: Green  Mobility: SBA  TELE: afib/cvr  Pain Management: Tylenol for R rib pain  Diet: Regular   Bowel/Bladder:  Continent, ambulates to BR SBA  Abnormal Lab/Assessments: Xa- on Heparin. A.m. Labs pending  Drain/Device/Wound: NA  Consults: AMITA  D/C Day/Goals/Place:      Shift Note:   VSS, tele afib/cvr, A/O x 4, very pleasant. Up to BR/SBA, voids. Tolerating PO. C/o R rib pain-Tylenol with effect. Heparin gtt, a.m. Xa pending. Hx bladder CA. CT showed possible liver mets. Patient has declined treatment. She is a retired RN. Pocket talker at bedside. discontinue 1-2 days when symptoms improve and anticoag plan is established.

## 2019-09-16 NOTE — CONSULTS
Care Transition Initial Assessment - SW     Met with: PATIENT   Active Problems:    Hyperlipidemia LDL goal <130    Other specified hypothyroidism    Essential hypertension with goal blood pressure less than 140/90    Atrial fibrillation, unspecified type (H)    Bladder cancer (H)    Hypothyroidism, unspecified type    Pulmonary embolism (H)       DATA  Lives With: alone   Living Arrangements: apartment  Quality of Family Relationships: supportive, involved  Description of Support System: Supportive, Involved  Who is your support system?: Children  Support Assessment: Adequate family and caregiver support.   Identified issues/concerns regarding health management: SW following for discharge needs  Quality of Family Relationships: supportive, involved  Transportation Anticipated: family or friend will provide    ASSESSMENT  Cognitive Status: Alert and oriented  Concerns to be addressed: Patient is a 89 year old female who was admitted to the hospital for pulmonary embolism. Prior to hospitalization patient was living at home in a apartment. Patient shares that her daughter and son in law live in the same apartment and are able to help as needed. Patient's son lives just a few blocks from her. Patient is aware that therapy is recommending she discharge to home. Patient is in agreement and does not feel she will have any other needs at this time.      PLAN  Financial costs for the patient includes  Patient given options and choices for discharge   Patient/family is agreeable to the plan?  YES  Transportation/person available to transport on day of discharge  is  and have they been notified/set up   Patient Goals and Preferences: Home  Patient anticipates discharging to: Home    MEME Vale, LGSW  *[phone]

## 2019-09-16 NOTE — PLAN OF CARE
7a-3p shift  Improving. Denies SOB, Respirations  non-labored and at normal rate. Tele showing A-Fib CVR with occasional PVC. Slight unsteady - requires SBA when up amb in room and to bathroom to void. Has been on Heparin drip for bilateral PE. Plan was to start Lovenox but pt changed her mind, refusing the Lovenox and wants to be on an oral anticoagulant for now and home. MD was texted the above pt's decision- awaiting his reply. Anticipate home possibly today after weaned off heparin drip

## 2019-09-16 NOTE — PROGRESS NOTES
[facility]    Medicine Progress Note - Hospitalist Service       Date of Admission:  9/14/2019  Date of Service: 09/16/2019    Assessment & Plan   Eliana Lambert is a 89 year old female admitted on 9/14/2019. She presents with dyspnea in setting of pulmonary embolism.     Bilateral pulmonary embolism (H)  KAY  Assessment: Presents with 2-month history of dyspnea with exertion with superimposed acute right sided breast pain.   CT chest shows bilateral pulmonary emboli. Retrocrural adenopathy. Multiple small mediastinal nodes.  Overall her symptoms are consistent with a pulmonary embolism.  She is hemodynamically stable, nontoxic-appearing.     Plan:   - Heparin infusion continued until Eliquis later today  - Follow vital/temp - stable  - Discussed as anticoagulation prior to discharge, discussed AC methods and pros n cons of warfarin, enoxaparin, NOAC etc - initially agreeable to Lovenox, but later wishes to trial Eliquis.     Hyperlipidemia LDL goal <130    Assessment/Plan: Not on statin, follows outpatient     Essential hypertension with goal blood pressure less than 140/90    Assessment: Not on blood pressure medications as outpatient  - stable     Atrial fibrillation, unspecified type (H)    Assessment: History noted, currently irreg irreg, nml rate, PTA asa only    Plan:   - Currently on heparin for anticoagulation.   - Eliquis at discharge     Bladder cancer (H)    Assessment: Follows with Dr. Mcclain, history of T1 transitional cell carcinoma the bladder.  Diagnosed in 2017. At that time, patient decided that she wishes to have no treatment or follow-up. CT this admission, shows liver lesions, indeterminate, but concerning for liver metastasis likely from her bladder cancer. She has good insight into her goals, DNR/DNI, and wishes for no surgery/chemotherapy intervention.    Plan:    - Follow with urology as outpatient per patient wishes.     Hypothyroidism, unspecified type     Assessment/Plan: Continue PTA Synthroid once verified       Diet: Combination Diet Regular Diet Adult  Room Service    DVT Prophylaxis: Heparin gtt  Goff Catheter: not present  Code Status: DNR/DNI      Disposition Plan   Expected discharge: tomorrow to home  Entered: Flo Murcia MD 09/16/2019, 2:45 PM       The patient's care was discussed with the Bedside Nurse, Patient and Patient's Family.    Flo Murcia MD  Hospitalist Service  [facility]    ______________________________________________________________________    Interval History      No acute events overnight  No new complaints or concerns. No sob or chest pain. Discussed anticoag options.     Data reviewed today: I reviewed all medications, new labs and imaging results over the last 24 hours. I personally reviewed no images or EKG's today.    Physical Exam   Vital Signs: Temp: 96.2  F (35.7  C) Temp src: Oral BP: 94/59 Pulse: 80 Heart Rate: 80 Resp: 17 SpO2: 96 % O2 Device: None (Room air)    Weight: 132 lbs 4.8 oz  Gen: NAD, pleasant  HEENT: Normocephalic, EOMI, MMM  Resp: no crackles,  no wheezes, no increased work of resp  CV: S1S2 heard, irreg irreg rhythm, reg rate, no pedal edema  Abdo: soft, nontender, nondistended, bowel sounds present  Ext: calves nontender, well perfused  Neuro: AAOx3, CN grossly intact, no facial asymmetry      Data   Recent Labs   Lab 09/15/19  0716 09/14/19  0957   WBC 11.2* 12.2*   HGB 12.6 13.9   MCV 92 93    237    136   POTASSIUM 4.0 4.0   CHLORIDE 106 102   CO2 26 27   BUN 17 14   CR 0.68 0.68   ANIONGAP 5 7   BRADEN 8.4* 9.3   * 94   ALBUMIN  --  3.1*   PROTTOTAL  --  7.4   BILITOTAL  --  0.6   ALKPHOS  --  139   ALT  --  24   AST  --  29   LIPASE  --  73   TROPI  --  <0.015     No results found for this or any previous visit (from the past 24 hour(s)).

## 2019-09-16 NOTE — CONSULTS
Medication coverage check for NOACs. Patient has a deductible remaining to meet.    Is eligible for free first month (will be automatically applied if patient uses discharge pharmacy) if she has not used this before.    1st month via insurance: $349 in order to meet deductible.  After that: $68 monthly.    Keri Short CphT  [facility] Discharge Pharmacy Liaison  Liaison Cell: [phone]

## 2019-09-16 NOTE — PLAN OF CARE
Patient is A+OX4. VSS on RA. Pain managed with tylenol. Up to BR with SBA. Heparin drip infusing- 10a check later this morning. Regular diet, tolerating well. Pt evaluated by PT- OK to discharge home from their perspective. Plan for discharge home with oral anticoagulants on 9/17.

## 2019-09-16 NOTE — PROVIDER NOTIFICATION
Text paged MD, Dr. Murcia regarding refusal of Lovenox. Stating she changed her mind and wants an oral agent instead to start today. Awaiting MD's advisement

## 2019-09-16 NOTE — PLAN OF CARE
AOx4, hard of hearing. VSS on RA. Tele: Afib. R rib pain, tylenol given x2, effective. LS: diminished. Regular diet, good appetite. Up SBA, ambulated honeycutt x2. R PIV SL. L PIV infusing Heparin@9.5, next hep 10a tomorrow 9/16. PT/OT consulted for eval. Continue to monitor.

## 2019-09-17 NOTE — PLAN OF CARE
A&Ox4. VSS. LS clear. Regular diet; tolerating well. Heparin drip stopped; Eliquis started. Ambulating to BR via SBA. Plan is to discharge 9/17 pending progress. KATE JEAN-BAPTISTE.

## 2019-09-17 NOTE — DISCHARGE INSTRUCTIONS
Read and refer to the attached education sheets jerome the following;  1. Preventing Falss in the home  2. Apixaban (ELiquis) - (your blood thinner medication to trest your pulmonary emboli (blood clots)  3.  Pulmonary Embolism (blood clots in lung)  4. Discharge instructions for Pulmonary Embolism

## 2019-09-17 NOTE — DISCHARGE SUMMARY
[facility]  Hospitalist Discharge Summary       Date of Admission:  9/14/2019  Date of Discharge:  9/17/2019  Discharging Provider: Flo Murcia MD      Discharge Diagnoses     Pulmonary Embolism  Metastatic malignancy to liver, suspect from previous bladder cancer    Follow-ups Needed After Discharge   Follow-up Appointments     Follow-up and recommended labs and tests       Follow up with primary care provider, Lowell Robertson, within 7 days for   hospital follow- up.  The following labs/tests are recommended: None.           Unresulted Labs Ordered in the Past 30 Days of this Admission     No orders found from 8/15/2019 to 9/15/2019.        Discharge Disposition   Discharged to home  Condition at discharge: Stable    Hospital Course      Eliana Lambert is a 89 year old female admitted on 9/14/2019. She presents with dyspnea in setting of pulmonary embolism.     Bilateral pulmonary embolism (H)  KAY  Assessment: Presents with 2-month history of dyspnea with exertion with superimposed acute right sided breast pain.   CT chest shows bilateral pulmonary emboli. Retrocrural adenopathy. Multiple small mediastinal nodes.  Overall her symptoms are consistent with a pulmonary embolism.  She is hemodynamically stable, nontoxic-appearing.     Plan:   - Discussed as anticoagulation prior to discharge, discussed AC methods and pros n cons of warfarin, enoxaparin, NOAC etc - initially agreeable to Lovenox, but later wishes to trial Eliquis.  - ASA stopped.      Hyperlipidemia LDL goal <130    Assessment/Plan: Not on statin, follows outpatient     Essential hypertension with goal blood pressure less than 140/90     Assessment/Plan: Lisinopril stopped     Atrial fibrillation, unspecified type (H)    Assessment: History noted, currently irreg irreg, nml rate, PTA asa only    Plan:   - Currently on heparin for anticoagulation.   - Eliquis at discharge     Bladder cancer (H)    Assessment: Follows with   Sarahi, history of T1 transitional cell carcinoma the bladder.  Diagnosed in 2017. At that time, patient decided that she wishes to have no treatment or follow-up. CT this admission, shows liver lesions, indeterminate, but concerning for liver metastasis likely from her bladder cancer. She has good insight into her goals, DNR/DNI, and wishes for no surgery/chemotherapy intervention.    Plan:    - Follow with urology as outpatient per patient wishes.     Hypothyroidism, unspecified type    Assessment/Plan: Continue PTA Synthroid        Consultations This Hospital Stay   PHARMACY TO DOSE HEPARIN  PHARMACY TO DOSE HEPARIN  PHYSICAL THERAPY ADULT IP CONSULT  CARE TRANSITION RN/SW IP CONSULT  PHARMACY LIAISON FOR MEDICATION COVERAGE CONSULT    Code Status   DNR/DNI    Time Spent on this Encounter   I, Flo Murcia MD, personally saw the patient today and spent greater than 30 minutes discharging this patient.       Flo Murcia MD  [facility]  ______________________________________________________________________    Physical Exam   Vital Signs: Temp: 97.9  F (36.6  C) Temp src: Oral BP: 113/52 Pulse: 82 Heart Rate: 78 Resp: 18 SpO2: 95 % O2 Device: None (Room air)    Weight: 132 lbs 4.8 oz    Primary Care Physician   Lowell Robertson    Discharge Orders      Reason for your hospital stay    You had a blood clot in your lung causing shortness of breath.     Follow-up and recommended labs and tests     Follow up with primary care provider, Lowell Robertson, within 7 days for hospital follow- up.  The following labs/tests are recommended: None.     Activity    Your activity upon discharge: activity as tolerated     Diet    Follow this diet upon discharge: Orders Placed This Encounter      Room Service      Combination Diet Regular Diet Adult       Significant Results and Procedures   Most Recent 3 CBC's:  Recent Labs   Lab Test 09/17/19  0725 09/15/19  0716 09/14/19  0957  06/28/17  1037 05/20/17  1030   WBC  10.2 11.2* 12.2*  --   --  7.9   HGB 12.3 12.6 13.9   < > 14.6 16.1*   MCV  --  92 93  --   --  93   PLT  --  224 237  --  280 246    < > = values in this interval not displayed.     Most Recent 3 BMP's:  Recent Labs   Lab Test 09/17/19  0725 09/15/19  0716 09/14/19  0957    137 136   POTASSIUM 4.0 4.0 4.0   CHLORIDE 105 106 102   CO2 25 26 27   BUN 11 17 14   CR 0.56 0.68 0.68   ANIONGAP 6 5 7   BRADEN 8.5 8.4* 9.3   GLC 94 106* 94     Most Recent 2 LFT's:  Recent Labs   Lab Test 09/14/19  0957 10/31/18  1001   AST 29 18   ALT 24 23   ALKPHOS 139 100   BILITOTAL 0.6 0.6     Most Recent 3 INR's:No lab results found.  Most Recent 3 BNP's:No lab results found.  Most Recent D-dimer:No lab results found.,   Results for orders placed or performed during the hospital encounter of 09/14/19   CT Chest Pulmonary Embolism w Contrast     Value    Radiologist flags Bilateral pulmonary emboli (AA)    Narrative    CT CHEST PULMONARY EMBOLISM WITH CONTRAST9/14/2019 12:17 PM    HISTORY: Right lower quadrant pleuritic pain, worsening shortness of  breath.     TECHNIQUE: Axial images from thoracic inlet to diaphragm. 58mL  Isovue-370 IV contrast. Radiation dose for this scan was reduced using  automated exposure control, adjustment of the mA and/or kV according  to patient size, or iterative reconstruction technique.    COMPARISON: 8/20/2019 chest x-ray.    FINDINGS:   Chest: Multiple bilateral pulmonary emboli in upper mid and lower  lungs extending proximally to the bifurcation of upper lobe and lower  lobe pulmonary arteries, but no main pulmonary artery emboli or  evidence for saddle embolus.    [Critical Result: Bilateral pulmonary emboli]  Finding was identified on 9/14/2019 12:32 PM.   Dr. Dominique was contacted by me on 9/14/2019 12:42 PM and verbalized  understanding of the critical result.      Small bilateral pleural effusions. Few very small indeterminate  pulmonary nodules under 0.6 cm in size, for example, in the  right  upper lobe series 6 image 23. Discoid atelectasis left lung base. No  evidence for thoracic aortic dissection, although suboptimal aortic  dissection. Indeterminate hypodense left thyroid nodule 1.6 cm.    Right retrocrural adenopathy and 1.5 cm nodule or node medial right  lung base series 5 image 95. Multiple hypodense liver lesions are  incompletely characterized, but concerning for liver metastasis and  new since 5/20/2017 CT abdomen and pelvis. Low dense lesion lateral  left kidney is incompletely evaluated on this exam, but may represent  a small renal cyst. Stable slight sclerosis right L3 vertebral body  since 2017 consistent with a benign etiology. No aggressive bone  lesions.      Impression    IMPRESSION:  1. Bilateral pulmonary emboli.  2. Retrocrural adenopathy. Multiple small mediastinal nodes.  3. Liver lesions, indeterminate, but concerning for liver metastasis.    NORBERTO GREEN MD       Discharge Medications   Current Discharge Medication List      START taking these medications    Details   !! apixaban ANTICOAGULANT (ELIQUIS) 5 MG tablet Take 1 tablet (5 mg) by mouth 2 times daily  Qty: 60 tablet, Refills: 0    Associated Diagnoses: Acute pulmonary embolism without acute cor pulmonale, unspecified pulmonary embolism type (H)      !! apixaban ANTICOAGULANT (ELIQUIS) 5 MG tablet Take 2 tablets (10 mg) by mouth 2 times daily for 12 doses  Qty: 24 tablet, Refills: 0    Associated Diagnoses: Acute pulmonary embolism without acute cor pulmonale, unspecified pulmonary embolism type (H)       !! - Potential duplicate medications found. Please discuss with provider.      CONTINUE these medications which have NOT CHANGED    Details   B Complex-C (SUPER B COMPLEX PO) Take 1 tablet by mouth daily       calcium carbonate (OS-BRADEN) 1500 (600 Ca) MG tablet Take 1,200 mg by mouth daily (2 tablets)      Cranberry 300 MG TABS Take 1 tablet by mouth daily      levothyroxine (SYNTHROID/LEVOTHROID) 25 MCG  tablet Take 1 tablet (25 mcg) by mouth daily Due for labs for further refills  Qty: 90 tablet, Refills: 0    Associated Diagnoses: Hypothyroidism, unspecified type      Multiple Vitamins-Minerals (PRESERVISION AREDS PO) Take 1 tablet by mouth 2 times daily      NONFORMULARY [place] Off 450 mg supplement - Take 2 tablets by mouth twice daily         STOP taking these medications       aspirin (ASA) 325 MG EC tablet Comments:   Reason for Stopping:         lisinopril (PRINIVIL/ZESTRIL) 5 MG tablet Comments:   Reason for Stopping:             Allergies   Allergies   Allergen Reactions     Aleve [Naproxen] Hives     Clindamycin GI Disturbance     Neosporin      Skin reaction     Shrimp      hives     Sulfa Drugs      nausea     Benzalkonium Chloride      Soap      Penicillins Rash

## 2019-09-17 NOTE — PLAN OF CARE
"7a-3p shift  Started Eliquis yesterday for newly found PE on this admision.  Pt feeling well. Denies pain, denies SOB. Is up ambulating in room independently to bathroom and in/out of chair with steady gait. Lungs clear, tolerating reg diet. Hoping to go home today. Spoke on phone with pharmacy Megan Richard who sated she \"did not have time to see pt yesterday\" but she did reportedly check on anticoagulant Eliquis coverage with her medicare D Coverage. - see her notes for results. Writer copied Jose Manuel's note and will give it to pt for a reference. Per phone call with Jose Manuel, \"If pt has any further questions of the cost pf Eliquis than she is to contact the Medicare D membership service support phone number\".   Plan for discharge home later today. Daughter aware  "

## 2019-09-17 NOTE — PLAN OF CARE
A&Ox4, [place]. VSS ex BP soft at times 90/55. CMS intact. Denies pain. Tolerating Regular diet. Ambulating SBA to BR, adequate UOP. IV SL. Discharge pending back home today. Continue to monitor.

## 2019-09-17 NOTE — PROGRESS NOTES
Patient discharged in w/c to home at 1530 via step force. Periph  IV site was discontinued. Denied pain at time of discharge. All belongings including both hearing aids returned to patient.  Discharge instructions and medications reviewed with patient - Emphasizing Eliquis medication, Pulmonary embolism, preventing falls at home    Patient verbalized understanding and all questions were answered.  Prescriptions of Eliquis filled her and was sent with pt. Extra time spent reviewing it's dosing. At time of discharge, patient condition was stable. No longer a fall risk -walk.

## 2019-09-19 NOTE — TELEPHONE ENCOUNTER
ED / Discharge Outreach Protocol    Patient Contact    Attempt # 2    Was call answered?  No.  Unable to leave message. Phone number listed as invalid/ busy.     Gisele PATEL, RN, BSN, PHN

## 2019-09-20 NOTE — TELEPHONE ENCOUNTER
ED / Discharge Outreach Protocol    Patient Contact    Attempt # 3    Was call answered?  No.  Left message on voicemail with information to call me back.      LM on patient daughter (Ting) VM to call back.    Gisele PATEL RN, BSN, PHN

## 2019-10-01 NOTE — PROGRESS NOTES
Subjective     Eliana Lambert is a 89 year old female who presents to clinic today for the following health issues:    HPI     She states that she is doing quite well with recent discharge from the hospital for her pulmonary embolism.  She has had a little fatigued and minimal little tired but otherwise is been tolerating her medications without difficulty.    She does state that about a week ago she noticed that she was having a little discomfort in one of her great toes.  The toe itself was not associated with any obvious or distinct trauma.  She describes some mild pain and discomfort in the toe that does not all the time but occasionally is worse at nighttime.  Does not report any pain or discomfort with weightbearing.    Hospital Follow-up Visit:    Hospital/Nursing Home/IP Rehab Facility: [facility]  Date of Admission: 9/14/19  Date of Discharge: 9/17/19  Reason(s) for Admission: SOB            Problems taking medications regularly:  None       Medication changes since discharge: None       Problems adhering to non-medication therapy:  None    Summary of hospitalization:  [facility] discharge summary reviewed  Diagnostic Tests/Treatments reviewed.  Follow up needed: noted  Other Healthcare Providers Involved in Patient s Care:         None  Update since discharge: stable.     Post Discharge Medication Reconciliation: discharge medications reconciled, continue medications without change.  Plan of care communicated with patient     Coding guidelines for this visit:  Type of Medical   Decision Making Face-to-Face Visit       within 7 Days of discharge Face-to-Face Visit        within 14 days of discharge   Moderate Complexity 13759 [id number]   High Complexity 16737 [id number]              Patient Active Problem List   Diagnosis     Hyperlipidemia LDL goal <130     Other specified hypothyroidism     Knee pain, unspecified laterality     Essential hypertension with goal blood pressure less than  140/90     Atrial fibrillation, unspecified type (H)     Bladder cancer (H)     ACP (advance care planning)     Hypothyroidism, unspecified type     Pulmonary embolism (H)     Primary osteoarthritis of right knee     Past Surgical History:   Procedure Laterality Date     CHOLECYSTECTOMY       CYSTOSCOPY, TRANSURETHRAL RESECTION (TUR) TUMOR BLADDER, COMBINED N/A 7/6/2017    Procedure: COMBINED CYSTOSCOPY, TRANSURETHRAL RESECTION (TUR) TUMOR BLADDER;  VIDEO CYSTOSCOPY, TRANSURETHRAL RESECTION (TUR) LARGE TUMOR BLADDER;  Surgeon: Artemio Mcclain MD;  Location:  OR     D & C         Social History     Tobacco Use     Smoking status: Never Smoker     Smokeless tobacco: Never Used   Substance Use Topics     Alcohol use: Yes     Comment: occassional      Family History   Problem Relation Age of Onset     No Known Problems Mother      No Known Problems Father          Current Outpatient Medications   Medication Sig Dispense Refill     apixaban ANTICOAGULANT (ELIQUIS) 5 MG tablet Take 1 tablet (5 mg) by mouth 2 times daily 60 tablet 0     B Complex-C (SUPER B COMPLEX PO) Take 1 tablet by mouth daily        calcium carbonate (OS-BRADEN) 1500 (600 Ca) MG tablet Take 1,200 mg by mouth daily (2 tablets)       Cranberry 300 MG TABS Take 1 tablet by mouth daily       levothyroxine (SYNTHROID/LEVOTHROID) 25 MCG tablet Take 1 tablet (25 mcg) by mouth daily Due for labs for further refills 90 tablet 0     Multiple Vitamins-Minerals (PRESERVISION AREDS PO) Take 1 tablet by mouth 2 times daily       NONFORMULARY [place] Off 450 mg supplement - Take 2 tablets by mouth twice daily       Allergies   Allergen Reactions     Aleve [Naproxen] Hives     Clindamycin GI Disturbance     Neosporin      Skin reaction     Shrimp      hives     Sulfa Drugs      nausea     Benzalkonium Chloride      Soap      Penicillins Rash     BP Readings from Last 3 Encounters:   09/17/19 113/52   08/20/19 108/62   07/09/19 124/68    Wt Readings from Last 3  Encounters:   09/15/19 60 kg (132 lb 4.8 oz)   08/20/19 60.9 kg (134 lb 4.8 oz)   07/09/19 63 kg (139 lb)            Bilateral pulmonary embolism (H)  KAY  Assessment: Presents with 2-month history of dyspnea with exertion with superimposed acute right sided breast pain.   CT chest shows bilateral pulmonary emboli. Retrocrural adenopathy. Multiple small mediastinal nodes.  Overall her symptoms are consistent with a pulmonary embolism.  She is hemodynamically stable, nontoxic-appearing.     Plan:   - Discussed as anticoagulation prior to discharge, discussed AC methods and pros n cons of warfarin, enoxaparin, NOAC etc - initially agreeable to Lovenox, but later wishes to trial Eliquis.  - ASA stopped.      Hyperlipidemia LDL goal <130    Assessment/Plan: Not on statin, follows outpatient     Essential hypertension with goal blood pressure less than 140/90     Assessment/Plan: Lisinopril stopped     Atrial fibrillation, unspecified type (H)    Assessment: History noted, currently irreg irreg, nml rate, PTA asa only    Plan:   - Currently on heparin for anticoagulation.   - Eliquis at discharge   Reviewed and updated as needed this visit by Provider         Review of Systems   ROS COMP: EYES: NEGATIVE for vision changes or irritation  ENT/MOUTH: NEGATIVE for ear, mouth and throat problems  RESP: NEGATIVE for significant cough  CV: NEGATIVE for chest pain, palpitations or peripheral edema  GI: NEGATIVE for nausea, abdominal pain, heartburn, or change in bowel habits  : NEGATIVE for frequency, dysuria, or hematuria  NEURO: NEGATIVE for weakness, dizziness or paresthesias  ENDOCRINE: NEGATIVE for temperature intolerance  HEME: NEGATIVE for bleeding problems  PSYCHIATRIC: NEGATIVE for changes in mood or affect      Objective    /68 (Cuff Size: Adult Regular)   Pulse 85   Temp 97.6  F (36.4  C) (Tympanic)   Resp 16   Wt 60.3 kg (133 lb)   SpO2 98%   BMI 25.13 kg/m    Body mass index is 25.13 kg/m .  Physical  Exam   GENERAL: alert and no distress wheelchair  EYES: Eyes grossly normal to inspection, PERRL and conjunctivae and sclerae normal  HENT: ear canals and TM's normal, nose and mouth without ulcers or lesions  NECK: no adenopathy, no asymmetry, masses, or scars and thyroid normal to palpation  RESP: lungs clear to auscultation - no rales, rhonchi or wheezes  CV: irregular rate and rhythm, normal S1 S2, no click or rub, no peripheral edema and peripheral pulses strong  MS: no gross musculoskeletal defects noted but there is coolness and a very distinct purplish demarcated skin change noted to the great toe that is slightly cooler than the opposite side.  There is no cellulitic change, the distal pulses are intact.  In general the foot is warm to touch.  There is mild pain noted to the tip of the toe.  There are dystrophic changes noted to the nailbeds of both feet bilaterally.  NEURO: No focal changes  PSYCH: mentation appears normal, affect normal/bright        Assessment & Plan     1. Hospital discharge follow-up  Appears overall stable and doing remarkably well post discharge.    2. Other pulmonary embolism without acute cor pulmonale, unspecified chronicity (H)  Anticoagulated with 1 of the newer novel oral anticoagulants, tolerating without difficulty.  Discussed pulmonary embolism and ongoing treatment and likely need for lifetime therapy    3. Discoloration of skin of toe  Difficult to assess.  Question musculoskeletal trauma with bleeding distally i.e. soft tissue injury versus an atypical presentation for embolic phenomena.  Patient is currently anticoagulated and I have recommended that we watch this very closely and discussed with her and her family that she needs to let us know if she finds that there is increasing pain or discomfort or worsening of the discoloration upwards into the toe or midfoot.  She was seen my colleague Dr. GORDILLO today also and will be seen on Friday for repeat clinical assessment  "by him.    4. Atrial fibrillation, unspecified type (H)  Rate controlled currently on no beta-blocker therapy due to patient request and continuing with anticoagulation    5. 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  CT noted on admission demonstrating liver lesions, indeterminate but concerning for potential liver metastasis likely from her bladder cancer.  Of note patient wishes no further intervention    6. Essential hypertension with goal blood pressure less than 140/90  Stable at goal on therapy continue with current medical management.    7. Need for prophylactic vaccination and inoculation against influenza  Flu vaccination updated today per patient request  - INFLUENZA (HIGH DOSE) 3 VALENT VACCINE [56399]  - Vaccine Administration, Initial [44010]     BMI:   Estimated body mass index is 25 kg/m  as calculated from the following:    Height as of 9/14/19: 1.549 m (5' 1\").    Weight as of 9/15/19: 60 kg (132 lb 4.8 oz).     See Patient Instructions    Return in about 3 days (around 10/4/2019), or Dr. Armenta foot check.    Lowell Robertson MD  [facility]        "

## 2019-10-03 PROBLEM — I99.8 LOWER LIMB ISCHEMIA: Status: ACTIVE | Noted: 2019-01-01

## 2019-10-03 NOTE — ED PROVIDER NOTES
History     Chief Complaint:  Deep Vein Thrombosis      HPI   Eliana Lambert is a 89 year old female who presents with right great toe pain and discoloration beginning about six days ago. The pain is constant and feels like she is being poked with hot needles. It has also prevented her from sleeping. She has increased right leg weakness which affects her walking. She was taking ibuprofen every four hours and took a oxycodone once. She notes increasing dyspnea on exertion over the last month. Patient was seen by her PCP and had a CT today, results below. Of note, patient was recently admitted on 9/14/19 to 9/17/19 for a pulmonary embolism and was placed on Eliquis. Patient states she is compliant with her medication. Daughter reports that she was diagnosed with A-Fib but declined blood thinners because it was too expensive. She denies other symptoms including chest pain, diarrhea, constipation, diaphoresis and numbness/tingling.    CTA Abdomen Pelvis Bilateral Leg Runoff with contrast, 10/3/19:  IMPRESSION:  1. 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  Disease throughout the right superficial femoral artery with  relatively small caliber and scattered stenoses. There are short  segment areas of occlusion scattered throughout the SFA. However,  popliteal and majority of runoff arteries appear patent.  2. Left SFA is diffusely small in caliber with scattered areas of  stenosis.  3. No abdominal aortic aneurysm. Visceral arteries are patent.  4. Hepatic masses and enlarged lymph nodes in the the hepatic hilum.  Also possible splenic mass. Presumably, these are metastatic, though  primary hepatic malignancy not excluded and clinical correlation will  be required.    PE/DVT Risk Factors   Personal History: Positive  Recent Travel: Negative   Recent Surgery/Hospitalization: Negative  Tobacco: Former Smoker  Family History: Negative  Hormone Use: Negative   Cancer: Negative  Trauma: Negative   "    Allergies:  Aleve  Benzalkonium chloride  Clindamycin   Neosporin  Penicillin  Shrimp  Sulfa     Medications:    Eliquis  Levothyroxine  Oxycodone      Past Medical History:    Atrial fibrillation  Bladder cancer  CKD  HLD  Osteoarthritis  PE  Thyroid disease    Past Surgical History:    Cholecystectomy   Cystoscopy, transurethral resection, tumor bladder, combined  D&C    Family History:    History reviewed. No pertinent family history.    Social History:  Presents to the ED with her daughter and son-in-law.   Tobacco Use: Former Smoker  Alcohol Use: Couple cans of beer weekly.  PCP: Lowell Robertson  Marital Status:   [5]     Review of Systems   Constitutional: Negative for diaphoresis.   Respiratory: Positive for shortness of breath.    Cardiovascular: Negative for chest pain.   Gastrointestinal: Negative for abdominal pain and diarrhea.   Musculoskeletal:        Positive for right toe pain.   Skin: Positive for color change.   Neurological: Positive for weakness. Negative for numbness.   All other systems reviewed and are negative.        Physical Exam   First Vitals:  BP: 108/66  Pulse: 113  Heart Rate: 72  Temp: 98.3  F (36.8  C)  Resp: 18  Height: 154.9 cm (5' 1\")  Weight: 60.3 kg (133 lb)  SpO2: 99 %      Physical Exam  GENERAL: well developed, pleasant  HEAD: atraumatic  EYES: pupils reactive, extraocular muscles intact, conjunctivae normal  ENT:  mucus membranes moist  NECK:  trachea midline, normal range of motion  RESPIRATORY: no tachypnea, breath sounds clear to auscultation   CVS: normal S1/S2, no murmurs, intact distal pulses  ABDOMEN: soft, nontender, nondistention  MUSCULOSKELETAL: Cool, blue right great toe. Unable to palpate pulse. Faint Doppler pulse.   SKIN: warm and dry, no acute rashes or ulceration  NEURO: GCS 15, cranial nerves intact, alert and oriented x3  PSYCH:  Mood/affect normal      Emergency Department Course     Imaging:    Radiographic findings were communicated with the " patient who voiced understanding of the findings.    CT Head w/o Contrast   Final Result   IMPRESSION:  No acute intracranial abnormality.        TERRENCE MONTENEGRO MD        Laboratory:  CBC:  WBC 15.4 (H), HGB 12.7, , otherwise WNL  BMP: WNL (Creatinine 0.72)  INR: 1.53 (H)  Hepatic panel: Bili Direct 0.2, Bili Total 0.6, Albumin 3.0 (L), Protein Total 7.3, Alkphos 161 (H), ALT 20, AST 28.  UA: Ordered  Blood cultures: Pending    Interventions:  1726: Normal Saline, 1 liter, IV bolus     Medications   0.9% sodium chloride BOLUS (1,000 mLs Intravenous New Bag 10/3/19 1726)     Emergency Department Course:  The patient arrived in triage where vitals were measured and recorded.   The patient was then escorted back to the emergency department.   The patient's medical records were reviewed.  Nursing notes and vitals were reviewed.  1708: I performed an exam of the patient as documented above.  The above workup was undertaken.  1740: I spoke with Dr. Cole vascular surgery.   Findings and plan explained to the patient who consents to admission.   1749: I discussed the patient with Dr. Stone of the hospitalist service, who will admit the patient to a [facility] bed for further monitoring, evaluation, and treatment.      Impression & Plan      Medical Decision Making:    Patient presents with painful right toe is been ongoing for the past week and had a CT showing arterial occlusion.  She is now developing pain into the right leg.  I went over CT findings with the patient and family regarding the hepatic mass and lymphadenopathy and possible splenic mass as well as the arterial occlusion.  Discussed with him the significance of the arterial occlusion.  Patient notes that she is ready to die.  She wants to be kept comfortable.  Did speak with vascular surgery who notes that the patient is not a candidate for a procedure.  Heparin was suggested in an admission.  Spoke with the hospital after head CT did not show any evidence of  a bleed.  Heparin was ordered.    Diagnosis:    ICD-10-CM    1. 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       RONNY, Constance Bustillos, am serving as a scribe on 10/3/2019 at 5:08 PM to personally document services performed by Dr. Guevara based on my observations and the provider's statements to me.    EMERGENCY DEPARTMENT       Michel Guevara MD  10/03/19 2145

## 2019-10-03 NOTE — PROGRESS NOTES
"Subjective     Eliana Lambert is a 89 year old female who presents to clinic today for the following health issues:    HPI   About a week ago she noticed that she was having a little discomfort in one of her great toes.  The toe itself was not associated with any obvious or distinct trauma.  She describes some mild pain and discomfort in the toe that does not all the time but occasionally is worse at nighttime.    Seen earlier this week by Dr. Robertson and asked to return today for recheck.  C/o worsening pain in great toe> 2nd or 3rd toes.  Other part of the foot is non-tender.  Pt hs hx of chronic afib- only recently started on anticoagulation with PE.     Pt reports increasing pain  Not sleeping at night  In addition diid have a fall and hit L side of face on floor last night, pt denies any loss of consciousness.  On Eliquis.     Reviewed and updated as needed this visit by Provider         Review of Systems   Constitutional: Positive for fatigue. Negative for chills and fever.   HENT: Negative.    Respiratory: Negative.    Cardiovascular: Negative.    Gastrointestinal: Negative.    Skin: Positive for color change, pallor and rash.   Neurological: Positive for weakness.            Objective    /72   Pulse 98   Temp 97.4  F (36.3  C) (Temporal)   Ht 1.549 m (5' 1\")   Wt 60.3 kg (133 lb)   SpO2 96%   BMI 25.13 kg/m    Body mass index is 25.13 kg/m .  Physical Exam  Vitals signs reviewed.   Constitutional:       General: She is in acute distress.      Appearance: She is not toxic-appearing.   HENT:      Head: Normocephalic.      Comments: Mild superficial ecchymoses on L sided upper cheek, face.      Nose: Nose normal.      Mouth/Throat:      Mouth: Mucous membranes are dry.   Eyes:      Pupils: Pupils are equal, round, and reactive to light.   Cardiovascular:      Rate and Rhythm: Normal rate. Rhythm irregular.      Pulses:           Dorsalis pedis pulses are 0 on the right side and detected w/ " "Doppler on the left side.        Posterior tibial pulses are 0 on the right side and detected w/ Doppler on the left side.      Heart sounds: S1 normal and S2 normal.      Comments: Both feet are warm despite absent pulses on R.   Musculoskeletal:      Right lower leg: No edema.      Left lower leg: No edema.   Feet:      Comments: R foot: 1st toe cyanotic, partial cyanosis noted 2nd and 3rd toes. Remainder of R foot warm.   Neurological:      Mental Status: She is alert.        Diagnostic Test Results:  CT angio pending        Assessment & Plan     1. 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 Ischemia of toe  Pretty certain her present symptoms are related to ischemia.  Emboli seems the most likely though I was unable to Doppler either her dorsalis pedis or posterior tib on the right.  Get emergent CTA of the right leg to evaluate this.  Continue Eliquis.   - CTA Lower Extremity Right with Contrast; Future  - Basic metabolic panel  - CBC with platelets  - oxyCODONE (ROXICODONE) 5 MG tablet; Take 1 tablet (5 mg) by mouth every 6 hours as needed for pain  Dispense: 12 tablet; Refill: 0    3. Atrial fibrillation, unspecified type (H)  Chronic, just recently started on Eliquis.  Cannot rule out cardiac emboli as a cause of present symptoms.  Consider echocardiogram    4. Other acute pulmonary embolism without acute cor pulmonale (H)  On eliquis since d/c.  Paradoxical emboli is a possibility for the cause of her present symptoms but this is much less likely than arterial emboli from her aorta or cardiac emboli given A. fib       BMI:   Estimated body mass index is 25.13 kg/m  as calculated from the following:    Height as of this encounter: 1.549 m (5' 1\").    Weight as of this encounter: 60.3 kg (133 lb).       Addendum:  CTA shows iliac and SFA thrombus on R  Pt to proceed to ER , r/o any possible intracranial bleeding due to her fall before any potential TPA.  Heparin gtt and vascular to see after " admission    See Patient Instructions    No follow-ups on file.    David Alarcon MD  [facility]

## 2019-10-03 NOTE — ED TRIAGE NOTES
Pt reports rt great toe has been discolored and painful. Pt had CT today and there are clots in leg arteries

## 2019-10-03 NOTE — NURSING NOTE
"Chief Complaint   Patient presents with     Toe Pain     /72   Pulse 98   Temp 97.4  F (36.3  C) (Temporal)   Ht 1.549 m (5' 1\")   Wt 60.3 kg (133 lb)   SpO2 96%   BMI 25.13 kg/m   Estimated body mass index is 25.13 kg/m  as calculated from the following:    Height as of this encounter: 1.549 m (5' 1\").    Weight as of this encounter: 60.3 kg (133 lb).        Health Maintenance due pending provider review:  NONE    n/a    Laine Cat CMA  "

## 2019-10-03 NOTE — PHARMACY-ADMISSION MEDICATION HISTORY
Admission medication history interview status for the 10/3/2019  admission is complete. See EPIC admission navigator for prior to admission medications     Medication history source reliability:Good    Actions taken by pharmacist (provider contacted, etc): Interviewed patient     Additional medication history information not noted on PTA med list :None    Medication reconciliation/reorder completed by provider prior to medication history? No    Time spent in this activity: 15 minutes    Prior to Admission medications    Medication Sig Last Dose Taking? Auth Provider   apixaban ANTICOAGULANT (ELIQUIS) 5 MG tablet Take 1 tablet (5 mg) by mouth 2 times daily 10/3/2019 at am Yes Flo Murcia MD   B Complex-C (SUPER B COMPLEX PO) Take 1 tablet by mouth daily  10/3/2019 at am Yes Reported, Patient   calcium carbonate (OS-BRADEN) 1500 (600 Ca) MG tablet Take 1,200 mg by mouth daily (2 tablets) 10/3/2019 at am Yes Unknown, Entered By History   Cranberry 300 MG TABS Take 1 tablet by mouth daily 10/3/2019 at am Yes Unknown, Entered By History   levothyroxine (SYNTHROID/LEVOTHROID) 25 MCG tablet Take 1 tablet (25 mcg) by mouth daily Due for labs for further refills 10/3/2019 at am Yes Lowell Robertson MD   Multiple Vitamins-Minerals (PRESERVISION AREDS PO) Take 1 tablet by mouth 2 times daily 10/3/2019 at am Yes Unknown, Entered By History   NONFORMULARY [place] Off 450 mg supplement - Take 2 tablets by mouth twice daily 10/3/2019 at am Yes Unknown, Entered By History   saccharomyces boulardii (FLORASTOR) 250 MG capsule Take 250 mg by mouth 2 times daily 10/3/2019 at am Yes Unknown, Entered By History   oxyCODONE (ROXICODONE) 5 MG tablet Take 1 tablet (5 mg) by mouth every 6 hours as needed for pain  at prn  David Alarcon MD Chelsea Mayer, PharmD  Inpatient Clinical Pharmacist  [phone]

## 2019-10-03 NOTE — ED NOTES
Bed: ED02  Expected date:   Expected time:   Means of arrival:   Comments:  Hold for  Cold, blue foot

## 2019-10-03 NOTE — ED NOTES
"[facility]  ED Nurse Handoff Report    ED Chief complaint: Deep Vein Thrombosis    ED Diagnosis:   Final diagnoses:   Arterial occlusion     Code Status: DNR / DNI last noted in system. Not discussed by ED provider. Needs to be discussed by hospitalist.     Allergies:   Allergies   Allergen Reactions     Aleve [Naproxen] Hives     Clindamycin GI Disturbance     Neosporin      Skin reaction     Shrimp      hives     Sulfa Drugs      nausea     Benzalkonium Chloride      Soap      Penicillins Rash       Activity level - Baseline/Home:  Independent  Activity Level - Current:   Unable to Assess, not out of bed in ED.     Patient's Preferred language: English   Needed?: No    Isolation: No  Infection: Not Applicable  Bariatric?: No    Vital Signs:   Vitals:    10/03/19 1645 10/03/19 1700 10/03/19 1715 10/03/19 1730   BP: 119/84 97/78 (!) 83/67 109/78   Pulse: 113 102 93 93   Resp:       Temp:       TempSrc:       SpO2: 99% 98% 98% 99%   Weight:       Height:           Is this patient confused?: No   Does this patient have a guardian?  No         If yes, is there guardianship documents in the Epic \"Code/ACP\" activity?  N/A         Guardian Notified?  N/A  [place] - Suicide Severity Rating Scale Completed?  Yes  If yes, what color did the patient score?  White    Patient Report: Initial Complaint: Deep Vein Thrombosis  Focused Assessment: pt reports to ED for evaluation of blood clots in leg that were found on a CT scan pt had today. Pt had a PE two weeks ago and was taking Eliquis. Pt reports that over the past couple days her right big toe has becoming increasing more discolored and today it's actually better than it's been her family member reports. Pt reports she fell last night while trying to grab a heating pad. Pt states she hit her head. Pt denies a headache. Alert and oriented x4.   Tests Performed:   Results for orders placed or performed during the hospital encounter of 10/03/19 "   INR   Result Value Ref Range    INR 1.53 (H) 0.86 - 1.14   CBC with platelets differential   Result Value Ref Range    WBC 15.4 (H) 4.0 - 11.0 10e9/L    RBC Count 4.21 3.8 - 5.2 10e12/L    Hemoglobin 12.7 11.7 - 15.7 g/dL    Hematocrit 38.7 35.0 - 47.0 %    MCV 92 78 - 100 fl    MCH 30.2 26.5 - 33.0 pg    MCHC 32.8 31.5 - 36.5 g/dL    RDW 14.2 10.0 - 15.0 %    Platelet Count 303 150 - 450 10e9/L    Diff Method Automated Method     % Neutrophils 76.4 %    % Lymphocytes 12.5 %    % Monocytes 8.2 %    % Eosinophils 2.3 %    % Basophils 0.3 %    % Immature Granulocytes 0.3 %    Nucleated RBCs 0 0 /100    Absolute Neutrophil 11.8 (H) 1.6 - 8.3 10e9/L    Absolute Lymphocytes 1.9 0.8 - 5.3 10e9/L    Absolute Monocytes 1.3 0.0 - 1.3 10e9/L    Absolute Eosinophils 0.4 0.0 - 0.7 10e9/L    Absolute Basophils 0.0 0.0 - 0.2 10e9/L    Abs Immature Granulocytes 0.1 0 - 0.4 10e9/L    Absolute Nucleated RBC 0.0    Basic metabolic panel   Result Value Ref Range    Sodium 134 133 - 144 mmol/L    Potassium 4.0 3.4 - 5.3 mmol/L    Chloride 101 94 - 109 mmol/L    Carbon Dioxide 27 20 - 32 mmol/L    Anion Gap 6 3 - 14 mmol/L    Glucose 93 70 - 99 mg/dL    Urea Nitrogen 14 7 - 30 mg/dL    Creatinine 0.72 0.52 - 1.04 mg/dL    GFR Estimate 72 >60 mL/min/[1.73_m2]    GFR Estimate If Black 83 >60 mL/min/[1.73_m2]    Calcium 9.7 8.5 - 10.1 mg/dL   Hepatic panel   Result Value Ref Range    Bilirubin Direct 0.2 0.0 - 0.2 mg/dL    Bilirubin Total 0.6 0.2 - 1.3 mg/dL    Albumin 3.0 (L) 3.4 - 5.0 g/dL    Protein Total 7.3 6.8 - 8.8 g/dL    Alkaline Phosphatase 161 (H) 40 - 150 U/L    ALT 20 0 - 50 U/L    AST 28 0 - 45 U/L     Abnormal Results:   Labs Ordered and Resulted from Time of ED Arrival Up to the Time of Departure from the ED   INR - Abnormal; Notable for the following components:       Result Value    INR 1.53 (*)     All other components within normal limits   CBC WITH PLATELETS DIFFERENTIAL - Abnormal; Notable for the following  components:    WBC 15.4 (*)     Absolute Neutrophil 11.8 (*)     All other components within normal limits   HEPATIC PANEL - Abnormal; Notable for the following components:    Albumin 3.0 (*)     Alkaline Phosphatase 161 (*)     All other components within normal limits   BASIC METABOLIC PANEL   Treatments provided: monitoring, IV fluids    Family Comments: family present at bedside.     OBS brochure/video discussed/provided to patient/family: N/A              Name of person given brochure if not patient: NA              Relationship to patient: NA    ED Medications:     Medications   0.9% sodium chloride BOLUS (1,000 mLs Intravenous New Bag 10/3/19 2839)     Drips infusing?:  No    For the majority of the shift this patient was Green.   Interventions performed were NA.    Severe Sepsis OR Septic Shock Diagnosis Present: No    To be done/followed up on inpatient unit:  NA    ED NURSE PHONE NUMBER: [phone]

## 2019-10-03 NOTE — PROGRESS NOTES
RECEIVING UNIT ED HANDOFF REVIEW    ED Nurse Handoff Report was reviewed by: Denae Henao RN on October 3, 2019 at 6:21 PM

## 2019-10-04 NOTE — PLAN OF CARE
Transitioned to comfort care. AOx4. VSS deferred. C/o R toe pain, controlled with oxycodone + tylenol. LS: diminished. Denies numbness/tingling. RLE pedal pulse weak. BLE edema +1. Regular diet, good appetite. Up A1, to BR. Chair for meals. L PIV SL. Repos independently. Continue to monitor and keep comfortable.

## 2019-10-04 NOTE — CONSULTS
SW Consult Note:    Care Transition Initial Assessment - SW     Met with: Patient and Family    Active Problems:    Lower limb ischemia       DATA  Lives With: alone      Quality of Family Relationships: helpful, involved, supportive  Description of Support System: Supportive, Involved  Who is your support system?: Children  Support Assessment: Adequate family and caregiver support, Adequate social supports.   Identified issues/concerns regarding health management:  Jorge is a 89 year old female that was admitted to the hospital on 10/3/19 with a primary diagnosis of lower limb ischemia. The tentative date for discharge is 10/5/19.  Reviewed chart and spoke with patient, son and daughter regarding discharge/hospice plans.  Patient has been residing at home in an independent apartment with with stairs and no elevator.  Patient's daughter lives in the same building, but they feel the stairs have become too much for patient and feel that she should live in another location.  SW discussed hospice agencies and they would like to use [facility].  Hospice Liaison to meet with patient and family at 13:00 today.  SW discussed room and board private pay costs and provided SNF list to patient and family.  Requested that referrals be sent to [facility], [facility], [facility]. Referrals sent via [facility].  SW spoke with Fly with [facility] who stated they have a LTC private room ($380/day) or shared ($311/day).  Patient's children live in [place]. SW will update family with which facilities are able to accept patient for Sunday.         Quality of Family Relationships: helpful, involved, supportive       ASSESSMENT  Cognitive Status:  awake, alert and oriented  Concerns to be addressed: Discharge planning/hospice.     PLAN  Financial costs for the patient includes: Discussed private pay for room and board.   Patient given options and choices for discharge: Yes,  provided resources for hospice homes, hospice agencies and SNF options.   Patient/family is agreeable to the plan?  Yes  Patient Goals and Preferences: Discharge to SNF with hospice Care.   Patient anticipates discharging to: Discharge to SNF with hospice care.    MEME Ortiz, LGSW

## 2019-10-04 NOTE — PLAN OF CARE
Patient is A/O x4. VSS on RA. Pt arrived from ED at 1930. Pt having 4/10  right foot/toe pain, controlled with prn tylenol. Pulses weak in RLE, BLE cool to touch, denies any numbness/tingling in BLE. Mild edema in bilat feet. LS diminished in bases, KAY. BS audible and active, passing gas. Up with A1/GB/W, pt states feeling a little weak. Regular diet, tolerating well. Hep gtt and IVF infusing.  Plan on continuing to monitor.

## 2019-10-04 NOTE — CONSULTS
Vascular Surgery    Called this evening regarding CT scan findings of various right lower extremity arterial occlusions noted on CTA of a 89 y/o female with 2+ weeks of right great toe pain and duskiness. When I was called by the ER attending, it was noted patient's CT also had splenic and liver masses concerning for metastases. I reviewed the CTA and see that there are segmental occlusions in the r EIA and CFA but reconstituted flow int he right SFA down into the tibial arteries suggesting some element of chronicity to this. I suggested that perhaps Palliative Care should be consulted as we would not recommend endovascular or open intervention in this patient. I did recommend a head CT given a recent fall and plans for possible heparin. If head CT negative, would consider initiating heparin and ivf. At this time, would leave care plans to Palliative and Medicine. Please don't hesitate to all us if questions. No vascular interventions or surgeries recommended.    Inez Cole MD, FACS, RPVI  Director, [facility]  Chief, Vascular and Endovascular Surgery  [facility]  Cell: [phone]  [email or web address]

## 2019-10-04 NOTE — CONSULTS
Writer met with pt, son Rishabh and daughter Ting to review hospice benefit and philosophy.  Pt would like to enroll in hospice services upon discharge.  Pt was living alone, and plan will be to find facility placement as pt needing higher level of care.  AD Louis assisting with options.  No consents signed today, as plan is pending.  Once discharge plan is in place, contact  Hospice to coordinate admission.    Would order the following comfort meds and send with pt upon discharge.  Likely pt will go to facility, so please do NOT order ranges and please BUBBLE PACK meds:    Atropine 1% drops, give 2 drops po/sl every 2 hours PRN to dry secretions  Bisacodyl 10mg supp, give 1 supp daily PRN constipation  Haloperidol 0.5mg tab, give 1 tab po/sl every 6 hours PRN nausea/agitation  Lorazepam 0.5mg tab, give 1/2 tab (0.25mg) po/sl every 4 hours PRN anxiety/restlessness  Acetaminophen 650mg supp, give 1 supp every 4 hours PRN pain/fever  Senna 8.6mg tab, give 1 tab PO BID PRN constipation  Oxycodone 40mg/ml, give 10 mg (0.25ml) po/sl every 2 hours PRN Pain/dyspnea (PLEASE ADJUST DOSING IF PT'S CURRENT ORDERS CHANGE)    Thank you for this consult.    [phone]

## 2019-10-04 NOTE — PROGRESS NOTES
"[facility]  Hospitalist Progress Note        Will Trisha Trip, DO  10/04/2019        Interval History:      Patient states that she just wants to be comfortable.          Assessment and Plan:        89-year-old female with past medical history significant for hypertension, hyperlipidemia, atrial fibrillation, history of recently diagnosed bilateral PEs, now on anticoagulation with Eliquis, and presumed metastatic bladder cancer who presented to the emergency room and was admitted to the hospital for management of lower limb ischemia with findings of an occluded right external iliac and common femoral artery on CT imaging.     Acute ischemic limb, bilateral PE.  The patient began noticing pain in her right great toe about a week or so ago.   - Pain control.   - Palliative care consulted.   - Heparin gtt.     Atrial fibrillation, HTN.  Rate controlled.  Not on rate control meds at baseline.  - Continues on anticoagulation as above.     Leukocytosis. suspect this is secondary to above.   - UA and blood cultures.     - Can initiate antibiotics if indicated, though again patient is favoring a comfort centered approach.       Hypothyroidism. Chronic and stable on Synthroid.       Deep venous thrombosis prophylaxis: heparin drip as above.      CODE: DNR/DNI     Disposition: Pending palliative discussion and consultation.                    Physical Exam:      Heart Rate: 97    Blood pressure 126/70, pulse 85, temperature 96.2  F (35.7  C), temperature source Oral, resp. rate 16, height 1.549 m (5' 1\"), weight 60.3 kg (133 lb), SpO2 99 %, not currently breastfeeding.    Vitals:    10/03/19 1619   Weight: 60.3 kg (133 lb)       Vital Sign Ranges  Temperature Temp  Av  F (36.1  C)  Min: 95.2  F (35.1  C)  Max: 98.3  F (36.8  C)   Blood pressure Systolic (24hrs), Av , Min:83 , Max:128        Diastolic (24hrs), Av, Min:56, Max:84      Pulse Pulse  Av.3  Min: 85  Max: 113   Respirations " Resp  Av.2  Min: 16  Max: 18   Pulse oximetry SpO2  Av.9 %  Min: 96 %  Max: 99 %     Vital Signs with Ranges  Temp:  [95.2  F (35.1  C)-98.3  F (36.8  C)] 96.2  F (35.7  C)  Pulse:  [] 85  Heart Rate:  [] 97  Resp:  [16-18] 16  BP: ()/(56-84) 126/70  SpO2:  [96 %-99 %] 99 %    I/O Last 3 Shifts:   I/O last 3 completed shifts:  In: 254.17 [P.O.:150; I.V.:104.17]  Out: -     I/O past 24 hours:     Intake/Output Summary (Last 24 hours) at 10/4/2019 0911  Last data filed at 10/4/2019 0100  Gross per 24 hour   Intake 254.17 ml   Output --   Net 254.17 ml     GENERAL: Alert. NAD  HEENT: Normocephalic. EOMI. No icterus or injection. Nares normal.   LUNGS: No dyspnea at rest.   HEART: Regular rate.   ABDOMEN: Soft, nontender, and nondistended. Positive bowel sounds.   EXTREMITIES: LE edema noted. RLE with ischemia.   NEUROLOGIC: Moves extremities x4. Follows commands.          Prior to Admission Medications:        Medications Prior to Admission   Medication Sig Dispense Refill Last Dose     apixaban ANTICOAGULANT (ELIQUIS) 5 MG tablet Take 1 tablet (5 mg) by mouth 2 times daily 60 tablet 0 10/3/2019 at am     B Complex-C (SUPER B COMPLEX PO) Take 1 tablet by mouth daily    10/3/2019 at am     calcium carbonate (OS-BRADEN) 1500 (600 Ca) MG tablet Take 1,200 mg by mouth daily (2 tablets)   10/3/2019 at am     Cranberry 300 MG TABS Take 1 tablet by mouth daily   10/3/2019 at am     levothyroxine (SYNTHROID/LEVOTHROID) 25 MCG tablet Take 1 tablet (25 mcg) by mouth daily Due for labs for further refills 90 tablet 0 10/3/2019 at am     Multiple Vitamins-Minerals (PRESERVISION AREDS PO) Take 1 tablet by mouth 2 times daily   10/3/2019 at am     NONFORMULARY [place] Off 450 mg supplement - Take 2 tablets by mouth twice daily   10/3/2019 at am     saccharomyces boulardii (FLORASTOR) 250 MG capsule Take 250 mg by mouth 2 times daily   10/3/2019 at am     oxyCODONE (ROXICODONE) 5 MG tablet Take 1 tablet (5  mg) by mouth every 6 hours as needed for pain 12 tablet 0  at prn            Medications:        Current Facility-Administered Medications   Medication Last Rate     HEParin 850 Units/hr (10/04/19 0300)     - MEDICATION INSTRUCTIONS -       sodium chloride 50 mL/hr at 10/03/19 1955     Current Facility-Administered Medications   Medication Dose Route Frequency     sodium chloride (PF)  3 mL Intracatheter Q8H     Current Facility-Administered Medications   Medication Dose Route Frequency     acetaminophen  650 mg Oral Q4H PRN     bisacodyl  10 mg Rectal Daily PRN     HYDROmorphone  0.5 mg Intravenous Q2H PRN     lidocaine 4%   Topical Q1H PRN     lidocaine (buffered or not buffered)  0.1-1 mL Other Q1H PRN     melatonin  1 mg Oral At Bedtime PRN     naloxone  0.1-0.4 mg Intravenous Q2 Min PRN     ondansetron  4 mg Oral Q6H PRN    Or     ondansetron  4 mg Intravenous Q6H PRN     oxyCODONE  5-10 mg Oral Q3H PRN     - MEDICATION INSTRUCTIONS -   Does not apply Continuous PRN     polyethylene glycol  17 g Oral Daily PRN     prochlorperazine  5 mg Intravenous Q6H PRN    Or     prochlorperazine  5 mg Oral Q6H PRN    Or     prochlorperazine  12.5 mg Rectal Q12H PRN     senna-docusate  1 tablet Oral BID PRN    Or     senna-docusate  2 tablet Oral BID PRN     sodium chloride (PF)  3 mL Intracatheter q1 min prn            Data:      Lab data reviewed.   Recent Labs   Lab 10/03/19  1627 10/03/19  1240   HGB 12.7 12.7   MCV 92 93    328   INR 1.53*  --     133   POTASSIUM 4.0 4.1   CHLORIDE 101 101   CO2 27 26   BUN 14 15   CR 0.72 0.70   ANIONGAP 6 6   BRADEN 9.7 9.4   GLC 93 92           Imaging:      Imaging data reviewed.     Dr. Will Dominique D.O.  [facility] Hospitalist  Pager [phone]

## 2019-10-04 NOTE — PLAN OF CARE
A&Ox4. CMS unchanged. R big toe dusky/purple cool to the touch. Denies numbness or tingling. VSS. Up with A1. Regular diet. Voiding. C/o R toe pain, decreased with tylenol. Heparin gtt running at 8.5ml/hr, Hep10a and PTT recheck at 0900. Discharge pending.

## 2019-10-04 NOTE — PROGRESS NOTES
"Vascular Surgery Update    S: patient on heparin gtt, notes right great toe pain.    O: /70 (BP Location: Right arm)   Pulse 85   Temp 96.2  F (35.7  C) (Oral)   Resp 16   Ht 1.549 m (5' 1\")   Wt 60.3 kg (133 lb)   SpO2 99%   BMI 25.13 kg/m      RLE: ischemic appearance of right great toe, motor/sensory intact, monophasic R DP, unable to doppler PT    89 yo F with right great toe pain in setting of multilevel RLE arterial occlusions.  Hx of bladder cancer (declined treatment) with splenic and liver masses also seen on imaging.    -please refer to Dr. Cole's original consult H&P from 10/3  -no acute surgical and endovascular intervention indicated  -agree with heparin gtt  -agree with Palliative/Hospice consult  -overall care per primary  -please call with questions, concerns, or changes in clinical course  -signing off  "

## 2019-10-04 NOTE — H&P
Admitted:     10/03/2019      PRIMARY CARE PROVIDER:  Lowell Robertson MD      CHIEF COMPLAINT:  Ischemic toe.      HISTORY OF PRESENT ILLNESS:  Eliana Lambert is a very pleasant 89 year old female with a past medical history significant for recently diagnosed bilateral PEs now on anticoagulation with Eliquis, chronic atrial fibrillation, hypothyroidism and history of bladder cancer with presumed metastatic disease who presents to the emergency room tonight for evaluation of right toe pain.  History is obtained per discussions with the patient, her family as well as a chart review.      She was recently hospitalized here from 9/14/19 to 9/17/19 for management of dyspnea on exertion secondary to bilateral PEs.  She was started on anticoagulation with Eliquis.  Workup on that stay included findings of hepatic lesions that were concerning for liver metastases that was likely secondary to her bladder cancer.  She was initially diagnosed with bladder cancer in 2017, but at that time decided not to proceed with any sort of treatment or surveillance.  She was discharged back to her living facility.  She had followed up with her primary care provider on 10/1.  At that time she was endorsing some right great toe discomfort.  It sounds as though her pain had begun in the days prior to that visit.  Close monitoring was recommended.  She returned to clinic again today for subsequent evaluation.  She had some noted discoloration of her toe with findings concerning for ischemia.  A CT angiogram of the abdomen and pelvis was obtained and showed an occlusion of the right external iliac and common femoral arteries.  Given these findings, she was referred to the emergency room for further evaluation.      In the emergency room, she was seen and evaluated by Dr. Michel Guevara.  She was afebrile, mildly tachycardic on presentation, but subsequently hemodynamically stable.  Labs were notable for a leukocytosis with a white count of 15.   "Dr. Guevara discussed the findings and presentation with Dr. Cole of Vascular Surgery, she did not recommend any interventions or surgical procedures and advised initiation of a heparin drip, medications for pain and a Palliative Care consult.      I saw the patient in the emergency room this evening.  She otherwise appeared comfortable.  She did endorse feeling chilled and was requesting an extra blanket, but otherwise no specific complaints at present.  We discussed limited options for management at this point beyond ongoing anticoagulation and pain control.  The patient's daughter (Ting) was at bedside and brought up a Palliative Care consult.  She told me they (her and the patient) had been having conversations off and on recently about possible enrollment in hospice.  They were in agreement with the plan for a heparin drip this evening pain control and a Palliative Care evaluation as well as meeting with hospice liaison in the morning.  In the interim there were plans to further evaluate her leukocytosis and concern for chills with additional testing including blood cultures and urinalysis.  I asked Eliana rubio if she wanted to proceed with these measures or simply focus on comfort.  She said she was agreeable to having some lab work done to see if there was \"anything else going on.\"  She notes she has a history of UTIs, but does not explicitly endorse any discomfort at present.      Following conversations with Vascular Surgery, the patient and her family this evening, Dr. Guevara and I elected against checking a lactic acid as we anticipated it would be elevated secondary to her limb ischemia and would not alter our treatment plan.     PAST MEDICAL HISTORY:   1.  Bilateral pulmonary emboli.  This was diagnosed during a hospitalization in 09/2019.  She was initiated on anticoagulation with Eliquis.   2.  History of chronic atrial fibrillation.  Prior to her recent hospitalization, she was managed only with " aspirin.  Was subsequently placed on anticoagulation at recent discharge as above.   3.  Hypertension.  Lisinopril was stopped during her recent hospitalization.   4.  Hyperlipidemia.   5.  Hypothyroidism.   6.  History of bladder cancer, now with suspected metastases.  She has previously followed with Dr. Mcclain.  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  Underwent a TURBT but ultimately decided against pursuing any interventions or further treatment.  A CT scan during a recent admission showed hepatic lesions concerning for metastases.  At that time, she had stated she would not wish for any sort of surgery or further treatment.  CT abdomen and pelvis earlier today again showed hepatic masses, enlarged lymphadenopathy and a possible splenic mass.      PAST SURGICAL HISTORY:  Remote cholecystectomy, history of a transurethral resection of bladder tumor in 07/2017.      FAMILY HISTORY:  Reviewed and noncontributory to current presentation.      SOCIAL HISTORY:  The patient is a lifelong nonsmoker.  She consumes alcohol on occasion, but denies daily use.  She lives in a senior living facility.  She is accompanied to the emergency room by her daughter, Ting, and her son-in-law, Roger, this evening.      HOME MEDICATIONS:   1.  Eliquis 5 mg b.i.d.   2.  Vitamin B complex daily.   3.  Calcium carbonate 1200 mg daily.   4.  Cranberry 300 mg daily.   5.  Levothyroxine 25 mcg daily.   6.  Multivitamin b.i.d.   7.  Colarest supplement 2 tablets b.i.d.   8.  Florastor 250 mg b.i.d.   9.  Oxycodone 5 mg every 6 hours as needed for pain.  This was just prescribed earlier today following her visit with Dr. Alarcon.      ALLERGIES:     1. NAPROXEN CAUSES HIVES.   2. CLINDAMYCIN CAUSES GI DISTURBANCE.     3. NEOSPORIN CAUSES A SKIN REACTION.   4. SHRIMP CAUSES HIVES.     5. SULFA DRUGS CAUSE NAUSEA,   6. PENICILLINS CAUSE A RASH.      REVIEW OF SYSTEMS:  A full 10-point  review of systems was obtained and negative unless otherwise stated per HPI.      PHYSICAL EXAMINATION:   VITAL SIGNS:  Temperature 98.3, pulse of 93, respirations 18, blood pressure 109/78, O2 sat 99% on room air.   GENERAL:  The patient is a well-nourished, well-developed female, appears stated age.  She is conversing appropriately.  Appears to be in no acute distress.   HEENT:  Pupils equal, round, reactive to light and accommodation; extraocular movements are intact.  Mucous membranes moist.   CARDIOVASCULAR:  Heart rate and rhythm are irregular.  I do not appreciate any murmurs, gallops or rubs.  Pulses are diminished in her right lower extremity.  She has evidence of ischemia involving her right great toe as well as the distal aspect of her second toe with decreased capillary refill.  Her right lower extremity feels cooler to touch as compared to left.   RESPIRATORY:  Lungs are clear to auscultation bilaterally, free of rales or rhonchi, no increased work of breathing or accessory muscle use.   ABDOMEN:  Soft, nontender, nondistended, positive bowel sounds throughout.   INTEGUMENTARY:  Cyanotic changes of her right great toe and right second toe as outlined above.  No other rashes, lesions or areas of cyanosis or ischemia.      LABORATORY DATA AND IMAGING:    BMP shows sodium 134, potassium 4.0, creatinine 0.72.  AST and ALT were normal.  Alkaline phosphatase 161, total bilirubin 0.6, glucose 93. CBC showed white count of 15.4, hemoglobin 12.7, and platelet count of 303.   Blood cultures x 2 are pending at this time.      CT angiogram of the abdomen and pelvis obtained earlier today showed an occlusion of the right external iliac and common femoral arteries with disease throughout the right superficial femoral artery with relatively small caliber and scattered stenoses, a few short segment areas of occlusion scattered through the SFA.  The popliteal and majority of runoff arteries appear patent.  The left SFA  is diffusely small in caliber with scattered areas of stenosis.  Lastly, there were hepatic masses and enlarged lymph nodes in the hepatic hilum and a possible splenic mass that were presumably metastatic in etiology.     CT of the head was obtained prior to initiation of anticoagulation given reports of fall and was negative for acute pathology.      ASSESSMENT AND PLAN:  Eliana Lambert is a very pleasant 89-year-old female with past medical history significant for hypertension, hyperlipidemia, atrial fibrillation, history of recently diagnosed bilateral PEs, now on anticoagulation with Eliquis, and presumed metastatic bladder cancer who presented to the emergency room [facility] and was admitted to the hospital [facility] for management of lower limb ischemia with findings of an occluded right external iliac and common femoral artery on CT imaging.   1.  Acute ischemic limb.  The patient began noticing pain in her right great toe about a week or so ago.  She thinks she actually may have had some toe discomfort during her recent hospitalization.  Workup today shows findings of an arterial occlusion involving the right external iliac and common femoral arteries.  Vascular Surgery Service was contacted and did not recommend any interventions or surgical procedures.  She will be placed on a heparin drip this evening and we will focus on pain control.  Can utilize oxycodone as needed and IV Dilaudid for breakthrough pain.  I discussed with the patient and her daughter and son-in-law at bedside this evening that she has a poor prognosis.  They are understanding of this.  For right now they favor continued attempts at adequate pain management.  They expressed interest in meeting with the Palliative Care Service to further outline goals of care, though seem to be favoring hospice.  I have placed consults both to the Palliative Care Team and the hospice liaison.  Their assistance with care is greatly appreciated.  Lactate was  not obtained in the emergency room as it was anticipated that it would be elevated given her findings on workup today but not alter the course of treatment.   2.  Bilateral PEs.  This was diagnosed during recent hospitalization.  She will be anticoagulated on heparin drip as above.  This evening she does not endorse any respiratory symptoms and her O2 sats are stable on room air.   3.  Atrial fibrillation.  Rate controlled.  Not on rate control meds at baseline.  Continues on anticoagulation as above.   4.  Hypertension.  Had been on lisinopril in the past, but this was stopped during her recent hospitalization.  5.  Leukocytosis.  I suspect this is secondary to above.  She does endorse feeling cold in the room, although does not appear to have chills at present.  She is afebrile and otherwise hemodynamically stable.  She does note a history of a predisposition to UTIs.  While awaiting outcome of goals of care conversation, will initiate basic infectious workup with a UA and some blood cultures.  She does not have any respiratory symptoms to suggest a developing pneumonia.  Can initiate antibiotics if indicated, though again patient is favoring a comfort centered approach.     6.  Hypothyroidism.  Chronic and stable on Synthroid.  This can be held now given otherwise acute illness with poor prognosis.     Deep venous thrombosis prophylaxis:  On heparin drip as above.      CODE STATUS:  I discussed this with the patient and her daughter at bedside this evening and she confirmed wishes for DNR/DNI status.         BRIAN PEREZ DO             D: 10/03/2019   T: 10/03/2019   MT: JULIANNE      Name:     CHANEL CALLEJAS   MRN:      [id number]        Account:      [id number]   :      1925        Admitted:     10/03/2019                   Document: [id number]       cc: Lowell Robertson MD

## 2019-10-05 NOTE — PLAN OF CARE
Comfort care. AOx4, forgetful. VSS deferred. C/o R toe pain, PRN oxycodone + Tylenol controlling pain. R toe, dusky. Regular diet, good appetite. Showered. Up A1 + W, to BR. L PIV SL. Plan to discharge to [facility] on Monday via [facility]. Continue to monitor.

## 2019-10-05 NOTE — PROVIDER NOTIFICATION
MD Notification    Notified Person: MD    Notified Person Name: Dr. Krishnan- palliative on call    Notification Date/Time: 10/4/19 1086    Notification Interaction: call back from page    Purpose of Notification: need another pain med option or give med early    Orders Received: Will adjust dosage    Comments:  (signed off today so go through hospitalist now)

## 2019-10-05 NOTE — PROGRESS NOTES
AD  I: AD was updated by [facility] that they can accept patient Sunday vs Monday. AD called patient's daughter Ting to discuss this further. AD left a message requesting a c/b. No other facilities have followed up regarding bed availability.     AD spoke with Ting and she had stated she would prefer [facility] if [facility] had a bed. AD called [facility] and they would have a bed Monday for patient. AD updated Ting and she is going to go tour but feels this will be the preferred location. AD will update [facility] once family confirm a bed.    ADDENDUM  I: AD met with patient and family and they would like patient to go to [facility] with  hospice. [facility] transport will need to be arranged. AD called  Hospice and updated them on plan. Hospice RN will sign consents Monday 10/7 AM. Meds will need to be filled and sent with patient. RN updated.     P: AD will continue to follow and assist as needed.    Lauren Ayers, MSW, LGSW  *[phone]

## 2019-10-05 NOTE — PROGRESS NOTES
Called by Marielle covington: increased pain - received 10 mg oxycodone about 90 minutes ago and still having severe pain.  Ordered for extra 10 mg x1 now, OK to give early.      Conchita Krishnan MD  Palliative Medicine  On call [phone]

## 2019-10-05 NOTE — PROGRESS NOTES
"[facility]  Hospitalist Progress Note        Will Trisha Trip, DO  10/05/2019        Interval History:      Patient states she is doing well, discussed plan of care.          Assessment and Plan:        89-year-old female with past medical history significant for hypertension, hyperlipidemia, atrial fibrillation, history of recently diagnosed bilateral PEs, on anticoagulation with Eliquis, and presumed metastatic bladder cancer who presented to the emergency room and was admitted to the hospital for management of lower limb ischemia with findings of an occluded right external iliac and common femoral artery on CT imaging.      Acute ischemic limb, bilateral PE.  The patient began noticing pain in her right great toe about a week or so ago.   - Transitioned to comfort measures.      Atrial fibrillation, HTN.  Rate controlled.  Not on rate control meds at baseline.  - Transitioned to comfort measures.      Leukocytosis. suspect this is secondary to above.   - Transitioned to comfort measures.      Hypothyroidism.   - Transitioned to comfort measures.      CODE: DNR/DNI      Disposition: Transitioned to comfort measures.                    Physical Exam:      Heart Rate: 104    Blood pressure 111/52, pulse 85, temperature 97.6  F (36.4  C), temperature source Oral, resp. rate 16, height 1.549 m (5' 1\"), weight 60.3 kg (133 lb), SpO2 91 %, not currently breastfeeding.    Vitals:    10/03/19 1619   Weight: 60.3 kg (133 lb)       Vital Sign Ranges  Temperature Temp  Av.6  F (36.4  C)  Min: 97.6  F (36.4  C)  Max: 97.6  F (36.4  C)   Blood pressure Systolic (24hrs), Av , Min:111 , Max:111        Diastolic (24hrs), Av, Min:52, Max:52      Pulse No data recorded   Respirations Resp  Av  Min: 16  Max: 16   Pulse oximetry SpO2  Av %  Min: 91 %  Max: 91 %     Vital Signs with Ranges  Temp:  [97.6  F (36.4  C)] 97.6  F (36.4  C)  Heart Rate:  [104] 104  Resp:  [16] 16  BP: (111)/(52) " 111/52  SpO2:  [91 %] 91 %    I/O Last 3 Shifts:   I/O last 3 completed shifts:  In: 240 [P.O.:240]  Out: -     I/O past 24 hours:   No intake or output data in the 24 hours ending 10/05/19 0901    GENERAL: Alert. NAD  HEENT: Normocephalic. EOMI. No icterus or injection. Nares normal.   LUNGS: No dyspnea at rest.   HEART: Regular rate.   NEUROLOGIC: Moves extremities x4. Follows commands.          Prior to Admission Medications:        Medications Prior to Admission   Medication Sig Dispense Refill Last Dose     apixaban ANTICOAGULANT (ELIQUIS) 5 MG tablet Take 1 tablet (5 mg) by mouth 2 times daily 60 tablet 0 10/3/2019 at am     B Complex-C (SUPER B COMPLEX PO) Take 1 tablet by mouth daily    10/3/2019 at am     calcium carbonate (OS-BRADEN) 1500 (600 Ca) MG tablet Take 1,200 mg by mouth daily (2 tablets)   10/3/2019 at am     Cranberry 300 MG TABS Take 1 tablet by mouth daily   10/3/2019 at am     levothyroxine (SYNTHROID/LEVOTHROID) 25 MCG tablet Take 1 tablet (25 mcg) by mouth daily Due for labs for further refills 90 tablet 0 10/3/2019 at am     Multiple Vitamins-Minerals (PRESERVISION AREDS PO) Take 1 tablet by mouth 2 times daily   10/3/2019 at am     NONFORMULARY [place] Off 450 mg supplement - Take 2 tablets by mouth twice daily   10/3/2019 at am     saccharomyces boulardii (FLORASTOR) 250 MG capsule Take 250 mg by mouth 2 times daily   10/3/2019 at am     oxyCODONE (ROXICODONE) 5 MG tablet Take 1 tablet (5 mg) by mouth every 6 hours as needed for pain 12 tablet 0  at prn            Medications:        Current Facility-Administered Medications   Medication Last Rate     Current Facility-Administered Medications   Medication Dose Route Frequency     senna-docusate  1-2 tablet Oral BID     sodium chloride (PF)  3 mL Intracatheter Q8H     Current Facility-Administered Medications   Medication Dose Route Frequency     acetaminophen  650 mg Oral Q4H PRN     atropine  1-2 drop Sublingual Q1H PRN     bisacodyl  10  mg Rectal Daily PRN     glycopyrrolate  0.2-0.4 mg Intravenous Q4H PRN     haloperidol  0.5-1 mg Oral Q6H PRN     lidocaine 4%   Topical Q1H PRN     lidocaine (buffered or not buffered)  0.1-1 mL Other Q1H PRN     LORazepam  0.5-1 mg Oral Q3H PRN     naloxone  0.1-0.4 mg Intravenous Q2 Min PRN     ondansetron  4 mg Oral Q6H PRN    Or     ondansetron  4 mg Intravenous Q6H PRN     oxyCODONE  10-15 mg Oral Q2H PRN     polyethylene glycol  17 g Oral Daily PRN     prochlorperazine  5 mg Intravenous Q6H PRN    Or     prochlorperazine  5 mg Oral Q6H PRN    Or     prochlorperazine  12.5 mg Rectal Q12H PRN     sodium chloride (PF)  3 mL Intracatheter q1 min prn            Data:      Lab data reviewed.   Recent Labs   Lab 10/04/19  0905 10/03/19  1627 10/03/19  1240   HGB 11.9 12.7 12.7   MCV 91 92 93    303 328   INR  --  1.53*  --     134 133   POTASSIUM 3.8 4.0 4.1   CHLORIDE 108 101 101   CO2 23 27 26   BUN 11 14 15   CR 0.61 0.72 0.70   ANIONGAP 5 6 6   BRADEN 8.3* 9.7 9.4   * 93 92           Imaging:      Imaging data reviewed.     Dr. Will Dominique D.O.  [facility]  Pager [phone]

## 2019-10-05 NOTE — PLAN OF CARE
Pt on comfort cares. VS and full assessment deferred. A&O x4- calls approp. Pain in R toe- high concentration oxycodone given x1, tylenol given x1, and 1 time dose of high concentration oxycodone given- ordered per palliative d/t uncontrollable pain. Slept well the rest of night with minimal pain. Continent. Discharge on hospice- probably Sunday per SW.

## 2019-10-06 NOTE — PROGRESS NOTES
SW:    I: AD following for discharge planning to [facility] on 10/7. AD contacted [facility] to confirm admission for tomorrow and inquire about any specific transport time that would be request from the facility's standpoint. Nursing to contact this writer and SW will arrange for stretcher transport. AD discussed with bedside RN and scripts have been printed, signed, and confirmed that these are to be sent to our discharge pharmacy to be filled.     P: SW to follow.    ADDENDUM: [facility] confirmed preference for 1100 discharge from [facility] on 10/7. AD contacted [facility] and arranged stretcher transport for this time. Pt required stretcher transport due to comfort care status and the need for continuous monitoring en route. PCS form completed and faxed to [facility] billing, original on chart.    MEME St, [facility]  Daytime (8:00am-4:30pm): [phone]  After-Hours AD Pager (4:30pm-11:30pm): [phone]

## 2019-10-06 NOTE — PROGRESS NOTES
"[facility]  Hospitalist Progress Note        Will Dominique, DO  10/06/2019        Interval History:      Patient resting comfortably.          Assessment and Plan:        89-year-old female with past medical history significant for hypertension, hyperlipidemia, atrial fibrillation, history of recently diagnosed bilateral PEs, on anticoagulation with Eliquis, and presumed metastatic bladder cancer who presented to the emergency room and was admitted to the hospital for management of lower limb ischemia with findings of an occluded right external iliac and common femoral artery on CT imaging.      Acute ischemic limb, bilateral PE.  The patient began noticing pain in her right great toe about a week or so ago.   - Transitioned to comfort measures.      Atrial fibrillation, HTN.  Rate controlled.  Not on rate control meds at baseline.  - Transitioned to comfort measures.      Leukocytosis. suspect this is secondary to above.   - Transitioned to comfort measures.      Hypothyroidism.   - Transitioned to comfort measures.      CODE: DNR/DNI      Disposition: Transitioned to comfort measures.                    Physical Exam:      Heart Rate: 104    Blood pressure 111/52, pulse 85, temperature 97.6  F (36.4  C), temperature source Oral, resp. rate 18, height 1.549 m (5' 1\"), weight 60.3 kg (133 lb), SpO2 91 %, not currently breastfeeding.    Vitals:    10/03/19 1619   Weight: 60.3 kg (133 lb)       Vital Sign Ranges  Temperature No data recorded   Blood pressure No data recorded.       No data recorded.      Pulse No data recorded   Respirations Resp  Av.7  Min: 16  Max: 18   Pulse oximetry No data recorded     Vital Signs with Ranges  Resp:  [16-18] 18    I/O Last 3 Shifts:   I/O last 3 completed shifts:  In: 240 [P.O.:240]  Out: 350 [Urine:350]    I/O past 24 hours:     Intake/Output Summary (Last 24 hours) at 10/6/2019 0853  Last data filed at 10/5/2019 2350  Gross per 24 hour   Intake 240 " ml   Output 350 ml   Net -110 ml     GENERAL: NAD  HEENT: Normocephalic. Nares normal.   LUNGS: No dyspnea at rest.   HEART: Regular rate.   NEUROLOGIC: Moves extremities x4. Follows commands.          Prior to Admission Medications:        Medications Prior to Admission   Medication Sig Dispense Refill Last Dose     apixaban ANTICOAGULANT (ELIQUIS) 5 MG tablet Take 1 tablet (5 mg) by mouth 2 times daily 60 tablet 0 10/3/2019 at am     B Complex-C (SUPER B COMPLEX PO) Take 1 tablet by mouth daily    10/3/2019 at am     calcium carbonate (OS-BRADEN) 1500 (600 Ca) MG tablet Take 1,200 mg by mouth daily (2 tablets)   10/3/2019 at am     Cranberry 300 MG TABS Take 1 tablet by mouth daily   10/3/2019 at am     levothyroxine (SYNTHROID/LEVOTHROID) 25 MCG tablet Take 1 tablet (25 mcg) by mouth daily Due for labs for further refills 90 tablet 0 10/3/2019 at am     Multiple Vitamins-Minerals (PRESERVISION AREDS PO) Take 1 tablet by mouth 2 times daily   10/3/2019 at am     NONFORMULARY [place] Off 450 mg supplement - Take 2 tablets by mouth twice daily   10/3/2019 at am     saccharomyces boulardii (FLORASTOR) 250 MG capsule Take 250 mg by mouth 2 times daily   10/3/2019 at am     oxyCODONE (ROXICODONE) 5 MG tablet Take 1 tablet (5 mg) by mouth every 6 hours as needed for pain 12 tablet 0  at prn            Medications:        Current Facility-Administered Medications   Medication Last Rate     Current Facility-Administered Medications   Medication Dose Route Frequency     senna-docusate  1-2 tablet Oral BID     sodium chloride (PF)  3 mL Intracatheter Q8H     Current Facility-Administered Medications   Medication Dose Route Frequency     acetaminophen  650 mg Oral Q4H PRN     atropine  1-2 drop Sublingual Q1H PRN     bisacodyl  10 mg Rectal Daily PRN     glycopyrrolate  0.2-0.4 mg Intravenous Q4H PRN     haloperidol  0.5-1 mg Oral Q6H PRN     lidocaine 4%   Topical Q1H PRN     lidocaine (buffered or not buffered)  0.1-1 mL  Other Q1H PRN     LORazepam  0.5-1 mg Oral Q3H PRN     naloxone  0.1-0.4 mg Intravenous Q2 Min PRN     ondansetron  4 mg Oral Q6H PRN    Or     ondansetron  4 mg Intravenous Q6H PRN     oxyCODONE  10-15 mg Oral Q2H PRN     polyethylene glycol  17 g Oral Daily PRN     prochlorperazine  5 mg Intravenous Q6H PRN    Or     prochlorperazine  5 mg Oral Q6H PRN    Or     prochlorperazine  12.5 mg Rectal Q12H PRN     sodium chloride (PF)  3 mL Intracatheter q1 min prn            Data:      Lab data reviewed.   Recent Labs   Lab 10/04/19  0905 10/03/19  1627 10/03/19  1240   HGB 11.9 12.7 12.7   MCV 91 92 93    303 328   INR  --  1.53*  --     134 133   POTASSIUM 3.8 4.0 4.1   CHLORIDE 108 101 101   CO2 23 27 26   BUN 11 14 15   CR 0.61 0.72 0.70   ANIONGAP 5 6 6   BRADEN 8.3* 9.7 9.4   * 93 92           Imaging:      Imaging data reviewed.     Dr. Will Dominique D.O.  [facility]  Pager [phone]

## 2019-10-06 NOTE — PLAN OF CARE
Pt on comfort cares- full assessment deferred. A&O x4. Pain in R toe/knee controlled well with oxycodone high concentration q2h while awake. R toe dusky and cool. Slept well overnight. Plan to continue comfort measures.

## 2019-10-06 NOTE — PLAN OF CARE
Comfort care. AOx4. VSS deferred. Toe pain managed with oxycodone. R toe dusky. Up A1, BR. Plan to discharge to [facility] tomorrow via stretcher @11, meds in drawer. Continue to monitor and keep comfortable.

## 2019-10-07 NOTE — PROGRESS NOTES
Patient discharged at 11:00 AM to [facility]. IV was discontinued. Pain at time of discharge was 0/10. Belongings returned to patient.  Discharge instructions and medications reviewed with patient.  Patient verbalized understanding and all questions were answered.  Prescriptions given to patient.  At time of discharge, patient condition was stable and left the unit via stretcher escorted by [facility].

## 2019-10-07 NOTE — PROGRESS NOTES
Discharge Note:    D/I:  Received discharge orders for patient.  Bed is available at [facility] for today. Patient has transport arranged for discharge to facility at 11:00 today.  Patient and family informed of plan and are in agreement to the plan.  Updated facility and faxed the orders. Meds will need to be filled and sent with patient.     MEME Ortiz, LGSW

## 2019-10-07 NOTE — PROGRESS NOTES
"SPIRITUAL [facility] Progress Note  [facility]    Initiated visit due to LOS.  Pt was alone in room.  Pt was alone in room, stating that she was being discharged soon.  Pt stated that she felt \"mixed up\" and had a sharp, stabbing pain in her foot.  Pt stated that she is not a spiritual person, and declined offer for prayer.  SH will follow-up as needed.      Luiz Mendez  Chaplain Resident    "

## 2019-10-07 NOTE — DISCHARGE SUMMARY
[facility]    Discharge Summary  Hospitalist    Date of Admission:  10/3/2019  Date of Discharge:  10/7/2019  Discharging Provider: Will Dominique  Date of Service (when I saw the patient): 10/07/19    History of Present Illness   Eliana Lambert is a very pleasant 89 year old female with a past medical history significant for recently diagnosed bilateral PEs now on anticoagulation with Eliquis, chronic atrial fibrillation, hypothyroidism and history of bladder cancer with presumed metastatic disease who presents to the emergency room tonight for evaluation of right toe pain.  History is obtained per discussions with the patient, her family as well as a chart review.      She was recently hospitalized here from 9/14/19 to 9/17/19 for management of dyspnea on exertion secondary to bilateral PEs.  She was started on anticoagulation with Eliquis.  Workup on that stay included findings of hepatic lesions that were concerning for liver metastases that was likely secondary to her bladder cancer.  She was initially diagnosed with bladder cancer in 2017, but at that time decided not to proceed with any sort of treatment or surveillance.  She was discharged back to her living facility.  She had followed up with her primary care provider on 10/1.  At that time she was endorsing some right great toe discomfort.  It sounds as though her pain had begun in the days prior to that visit.  Close monitoring was recommended.  She returned to clinic again today for subsequent evaluation.  She had some noted discoloration of her toe with findings concerning for ischemia.  A CT angiogram of the abdomen and pelvis was obtained and showed an occlusion of the right external iliac and common femoral arteries.  Given these findings, she was referred to the emergency room for further evaluation.      In the emergency room, she was seen and evaluated by Dr. Michel Guevara.  She was afebrile, mildly tachycardic on  "presentation, but subsequently hemodynamically stable.  Labs were notable for a leukocytosis with a white count of 15.  Dr. Guevara discussed the findings and presentation with Dr. Cole of Vascular Surgery, she did not recommend any interventions or surgical procedures and advised initiation of a heparin drip, medications for pain and a Palliative Care consult.      I saw the patient in the emergency room this evening.  She otherwise appeared comfortable.  She did endorse feeling chilled and was requesting an extra blanket, but otherwise no specific complaints at present.  We discussed limited options for management at this point beyond ongoing anticoagulation and pain control.  The patient's daughter (Ting) was at bedside and brought up a Palliative Care consult.  She told me they (her and the patient) had been having conversations off and on recently about possible enrollment in hospice.  They were in agreement with the plan for a heparin drip this evening pain control and a Palliative Care evaluation as well as meeting with hospice liaison in the morning.  In the interim there were plans to further evaluate her leukocytosis and concern for chills with additional testing including blood cultures and urinalysis.  I asked Eliana rubio if she wanted to proceed with these measures or simply focus on comfort.  She said she was agreeable to having some lab work done to see if there was \"anything else going on.\"  She notes she has a history of UTIs, but does not explicitly endorse any discomfort at present.       Following conversations with Vascular Surgery, the patient and her family this evening, Dr. Guevara and I elected against checking a lactic acid as we anticipated it would be elevated secondary to her limb ischemia and would not alter our treatment plan.    Hospital Course   Eliana Lambert was admitted on 10/3/2019.  The following problems were addressed during her hospitalization:    89-year-old female " with past medical history significant for hypertension, hyperlipidemia, atrial fibrillation, history of recently diagnosed bilateral PEs, on anticoagulation with Eliquis, and presumed metastatic bladder cancer who presented to the emergency room and was admitted to the hospital for management of lower limb ischemia with findings of an occluded right external iliac and common femoral artery on CT imaging.      Acute ischemic limb, bilateral PE.  The patient began noticing pain in her right great toe about a week or so ago.   - Transitioned to comfort measures.      Atrial fibrillation, HTN.  Rate controlled.  Not on rate control meds at baseline.  - Transitioned to comfort measures.      Leukocytosis. suspect this is secondary to above.   - Transitioned to comfort measures.      Hypothyroidism.   - Transitioned to comfort measures.      Pending Results   These results will be followed up by Hospice.   Unresulted Labs Ordered in the Past 30 Days of this Admission     Date and Time Order Name Status Description    10/3/2019 1806 Blood culture Preliminary     10/3/2019 1806 Blood culture Preliminary           Code Status   Comfort Care       Primary Care Physician   Lowell Robertson    Physical Exam                      Vitals:    10/03/19 1619   Weight: 60.3 kg (133 lb)     Vital Signs with Ranges     No intake/output data recorded.    GENERAL: NAD  HEENT: Normocephalic. Nares normal.   LUNGS: No dyspnea at rest.   HEART: Regular rate.   NEUROLOGIC: Moves extremities x4. Follows commands.     Discharge Disposition   Discharged to hospice  Condition at discharge: Terminal    Consultations This Hospital Stay   PHARMACY TO DOSE HEPARIN  PALLIATIVE CARE ADULT IP CONSULT  SOCIAL WORK IP CONSULT  PHARMACY TO DOSE HEPARIN  VASCULAR SURGERY IP CONSULT    Time Spent on this Encounter   Will JEFFERSON DO, personally saw the patient today and spent greater than 30 minutes discharging this patient.    Discharge Orders       General info for SNF    Length of Stay Estimate: Long Term Care  Condition at Discharge: Declining  Level of care:board and care  Rehabilitation Potential: Poor  Admission H&P remains valid and up-to-date: Yes  Recent Chemotherapy: N/A  Use Nursing Home Standing Orders: Yes     Mantoux instructions    Give two-step Mantoux (PPD) Per Facility Policy Yes     Reason for your hospital stay    Vascular disease.     Activity - Up with nursing assistance     Follow Up and recommended labs and tests    Follow up with hospice as directed.     DNR/DNI     Fall precautions     Advance Diet as Tolerated    Follow this diet upon discharge: Orders Placed This Encounter      Room Service      Combination Diet Regular Diet Adult     Discharge Medications   Current Discharge Medication List      START taking these medications    Details   atropine 1 % ophthalmic solution Place 2 drops under the tongue every hour as needed for other (secretions)  Qty: 1 Bottle, Refills: 0    Associated Diagnoses: Lower limb ischemia      bisacodyl (DULCOLAX) 10 MG suppository Place 1 suppository (10 mg) rectally daily as needed for constipation    Associated Diagnoses: Lower limb ischemia      haloperidol (HALDOL) 0.5 MG tablet Take 1-2 tablets (0.5-1 mg) by mouth every 6 hours as needed for agitation    Associated Diagnoses: Lower limb ischemia      LORazepam (ATIVAN) 0.5 MG tablet Take 1-2 tablets (0.5-1 mg) by mouth every 4 hours as needed for anxiety  Qty: 15 tablet, Refills: 0    Associated Diagnoses: Lower limb ischemia      oxyCODONE HCl (ROXICODONE INTENSOL) 10 MG/0.5ML (HIGH CONC) solution Take 0.75 mLs (15 mg) by mouth every 2 hours as needed for pain  Qty: 1 Bottle, Refills: 0    Associated Diagnoses: Lower limb ischemia      sennosides (SENOKOT) 8.6 MG tablet Take 1 tablet by mouth daily as needed for constipation    Associated Diagnoses: Lower limb ischemia         STOP taking these medications       apixaban ANTICOAGULANT (ELIQUIS) 5  MG tablet Comments:   Reason for Stopping:         B Complex-C (SUPER B COMPLEX PO) Comments:   Reason for Stopping:         calcium carbonate (OS-BRADEN) 1500 (600 Ca) MG tablet Comments:   Reason for Stopping:         Cranberry 300 MG TABS Comments:   Reason for Stopping:         levothyroxine (SYNTHROID/LEVOTHROID) 25 MCG tablet Comments:   Reason for Stopping:         Multiple Vitamins-Minerals (PRESERVISION AREDS PO) Comments:   Reason for Stopping:         NONFORMULARY Comments:   Reason for Stopping:         oxyCODONE (ROXICODONE) 5 MG tablet Comments:   Reason for Stopping:         saccharomyces boulardii (FLORASTOR) 250 MG capsule Comments:   Reason for Stopping:             Allergies   Allergies   Allergen Reactions     Aleve [Naproxen] Hives     Clindamycin GI Disturbance     Neosporin      Skin reaction     Shrimp      hives     Sulfa Drugs      nausea     Benzalkonium Chloride      Soap      Penicillins Rash     Data   Most Recent 3 CBC's:  Recent Labs   Lab Test 10/04/19  0905 10/03/19  1627 10/03/19  1240   WBC 14.6* 15.4* 15.1*   HGB 11.9 12.7 12.7   MCV 91 92 93    303 328      Most Recent 3 BMP's:  Recent Labs   Lab Test 10/04/19  0905 10/03/19  1627 10/03/19  1240    134 133   POTASSIUM 3.8 4.0 4.1   CHLORIDE 108 101 101   CO2 23 27 26   BUN 11 14 15   CR 0.61 0.72 0.70   ANIONGAP 5 6 6   BRADEN 8.3* 9.7 9.4   * 93 92     Most Recent 2 LFT's:  Recent Labs   Lab Test 10/03/19  1627 09/14/19  0957   AST 28 29   ALT 20 24   ALKPHOS 161* 139   BILITOTAL 0.6 0.6     Most Recent INR's and Anticoagulation Dosing History:  Anticoagulation Dose History     Recent Dosing and Labs Latest Ref Rng & Units 10/3/2019    INR 0.86 - 1.14 1.53(H)        Most Recent 3 Troponin's:  Recent Labs   Lab Test 09/14/19  0957   TROPI <0.015     Most Recent Cholesterol Panel:  Recent Labs   Lab Test 10/25/12   CHOL 222*      HDL 65   TRIG 91     Most Recent 6 Bacteria Isolates From Any Culture (See EPIC  Reports for Culture Details):  Recent Labs   Lab Test 10/03/19  1850 10/03/19  1832 10/03/19  1825 07/09/19  1603 07/06/17  1359 05/11/14  1230   CULT 50,000 to 100,000 colonies/mL  mixed urogenital derrell  Susceptibility testing not routinely done   No growth after 4 days No growth after 4 days 10,000 to 50,000 colonies/mL  mixed urogenital derrell   >100,000 colonies/mL Escherichia coli  50,000 to 100,000 colonies/mL Strain 2 Escherichia coli  * 10,000 to 50,000 colonies/mL Mixed gram negative and positive derrell  Multiple species present, probable perineal contamination. Susceptibility testing   not routinely done       Most Recent TSH, T4 and A1c Labs:  Recent Labs   Lab Test 10/31/18  1001   TSH 2.98     Results for orders placed or performed during the hospital encounter of 10/03/19   CT Head w/o Contrast    Narrative    CT SCAN OF THE HEAD WITHOUT CONTRAST   10/3/2019 6:06 PM     HISTORY: Trauma.    TECHNIQUE:  Axial images of the head and coronal reformations without  IV contrast material. Radiation dose for this scan was reduced using  automated exposure control, adjustment of the mA and/or kV according  to patient size, or iterative reconstruction technique.    COMPARISON: None.    FINDINGS:  Moderate volume loss is present. Patchy periventricular  white matter hypoattenuation likely represents chronic small vessel  ischemic change. No evidence of acute ischemia, hemorrhage, mass, mass  effect, or hydrocephalus. The visualized calvarium, tympanic cavities,  mastoid cavities, and paranasal sinuses are unremarkable.      Impression    IMPRESSION:  No acute intracranial abnormality.      TERRENCE MONTENEGRO MD

## 2019-10-07 NOTE — PLAN OF CARE
Comfort cares. VS deferred. Oxycodone 15 mg high concentration given x1. Slept well all night. Calls appropriately. Plan to discharge to [facility] today at 1100 via [facility]- still needs some med scripts signed.

## 2019-10-08 NOTE — TELEPHONE ENCOUNTER
ED / Discharge Outreach Protocol    Patient Contact    Attempt # 1    Was call answered?  No.  Unable to leave message, phone ringing and ringing with no voicemail.

## 2019-10-09 ENCOUNTER — DOCUMENTATION ONLY (OUTPATIENT)
Dept: OTHER | Facility: CLINIC | Age: 84
End: 2019-10-09

## 2019-10-09 PROBLEM — Z71.89 ACP (ADVANCE CARE PLANNING): Chronic | Status: RESOLVED | Noted: 2017-12-15 | Resolved: 2019-10-09

## 2019-10-09 LAB
BACTERIA SPEC CULT: NO GROWTH
BACTERIA SPEC CULT: NO GROWTH
Lab: NORMAL
Lab: NORMAL
SPECIMEN SOURCE: NORMAL
SPECIMEN SOURCE: NORMAL

## 2019-10-09 NOTE — TELEPHONE ENCOUNTER
ED / Discharge Outreach Protocol    Patient Contact    Attempt # 3    Was call answered?  No.  No answer, unable to leave message

## (undated) DEVICE — BAG URINARY 4000ML

## (undated) DEVICE — GLOVE PROTEXIS BLUE W/NEU-THERA 6.5  2D73EB65

## (undated) DEVICE — SOL SORBITOL 3% IRRIG 3000ML BAG 2B7357

## (undated) DEVICE — TUBING IRRIG TUR Y TYPE 96" LF 6543-01

## (undated) DEVICE — ESU GROUND PAD ADULT W/CORD E7507

## (undated) DEVICE — SYR 70ML TOOMEY 041170

## (undated) DEVICE — GLOVE PROTEXIS POWDER FREE SMT 7.5  2D72PT75X

## (undated) DEVICE — COVER FOOTSWITCH URO

## (undated) DEVICE — CATH FOLEY 3WAY 20FR 30ML LATEX 0167SI20

## (undated) DEVICE — CATH HOLDER STRAP 36600

## (undated) DEVICE — LINEN HALF SHEET 5512

## (undated) DEVICE — LINEN FULL SHEET 5511

## (undated) DEVICE — ESU ELEC ROLLERBALL 24FR 3MM  27050N

## (undated) DEVICE — SOL WATER IRRIG 1000ML BOTTLE 2F7114

## (undated) DEVICE — BAG CLEAR TRASH 1.3M 39X33" P4040C

## (undated) DEVICE — ESU ELEC LOOP 24FR 27040G

## (undated) DEVICE — PAD CHUX UNDERPAD 30X36" P3036C

## (undated) DEVICE — PACK CYSTO CUSTOM RIDGES

## (undated) RX ORDER — FENTANYL CITRATE 50 UG/ML
INJECTION, SOLUTION INTRAMUSCULAR; INTRAVENOUS
Status: DISPENSED
Start: 2017-07-06

## (undated) RX ORDER — GLYCOPYRROLATE 0.2 MG/ML
INJECTION INTRAMUSCULAR; INTRAVENOUS
Status: DISPENSED
Start: 2017-07-06

## (undated) RX ORDER — LEVOFLOXACIN 5 MG/ML
INJECTION, SOLUTION INTRAVENOUS
Status: DISPENSED
Start: 2017-07-06

## (undated) RX ORDER — ONDANSETRON 2 MG/ML
INJECTION INTRAMUSCULAR; INTRAVENOUS
Status: DISPENSED
Start: 2017-07-06

## (undated) RX ORDER — DEXAMETHASONE SODIUM PHOSPHATE 4 MG/ML
INJECTION, SOLUTION INTRA-ARTICULAR; INTRALESIONAL; INTRAMUSCULAR; INTRAVENOUS; SOFT TISSUE
Status: DISPENSED
Start: 2017-07-06

## (undated) RX ORDER — PROPOFOL 10 MG/ML
INJECTION, EMULSION INTRAVENOUS
Status: DISPENSED
Start: 2017-07-06

## (undated) RX ORDER — LIDOCAINE HYDROCHLORIDE 10 MG/ML
INJECTION, SOLUTION EPIDURAL; INFILTRATION; INTRACAUDAL; PERINEURAL
Status: DISPENSED
Start: 2017-07-06